# Patient Record
Sex: FEMALE | Race: WHITE | NOT HISPANIC OR LATINO | Employment: OTHER | URBAN - METROPOLITAN AREA
[De-identification: names, ages, dates, MRNs, and addresses within clinical notes are randomized per-mention and may not be internally consistent; named-entity substitution may affect disease eponyms.]

---

## 2018-02-08 ENCOUNTER — OFFICE VISIT (OUTPATIENT)
Dept: URGENT CARE | Facility: CLINIC | Age: 83
End: 2018-02-08
Payer: MEDICARE

## 2018-02-08 VITALS
TEMPERATURE: 97.2 F | DIASTOLIC BLOOD PRESSURE: 67 MMHG | SYSTOLIC BLOOD PRESSURE: 145 MMHG | HEART RATE: 77 BPM | RESPIRATION RATE: 18 BRPM | OXYGEN SATURATION: 96 %

## 2018-02-08 DIAGNOSIS — H66.91 RIGHT OTITIS MEDIA, UNSPECIFIED OTITIS MEDIA TYPE: ICD-10-CM

## 2018-02-08 DIAGNOSIS — H61.21 IMPACTED CERUMEN OF RIGHT EAR: Primary | ICD-10-CM

## 2018-02-08 PROCEDURE — G0463 HOSPITAL OUTPT CLINIC VISIT: HCPCS | Performed by: NURSE PRACTITIONER

## 2018-02-08 PROCEDURE — 99203 OFFICE O/P NEW LOW 30 MIN: CPT | Performed by: NURSE PRACTITIONER

## 2018-02-08 PROCEDURE — 69209 REMOVE IMPACTED EAR WAX UNI: CPT | Performed by: NURSE PRACTITIONER

## 2018-02-08 RX ORDER — AMOXICILLIN AND CLAVULANATE POTASSIUM 875; 125 MG/1; MG/1
1 TABLET, FILM COATED ORAL 2 TIMES DAILY
Qty: 14 TABLET | Refills: 0 | Status: SHIPPED | OUTPATIENT
Start: 2018-02-08 | End: 2018-02-15

## 2018-02-08 NOTE — PROGRESS NOTES
Assessment/Plan     Diagnoses and all orders for this visit:    Impacted cerumen of right ear    Right otitis media, unspecified otitis media type  -     amoxicillin-clavulanate (AUGMENTIN) 875-125 mg per tablet; Take 1 tablet by mouth 2 (two) times a day for 7 days          Subjective:     Patient ID: Julianne Rodriguez is a 80 y o  female  Chief Complaint:    Earache    The current episode started in the past 7 days  The problem has been unchanged  There has been no fever  Associated symptoms include hearing loss  Pertinent negatives include no coughing, ear discharge, rhinorrhea or sore throat  No past medical history on file  No past surgical history on file  No family history on file  Review of Systems   Constitutional: Negative  HENT: Positive for ear pain and hearing loss  Negative for ear discharge, rhinorrhea and sore throat  Eyes: Negative  Respiratory: Negative for cough  Cardiovascular: Negative  Gastrointestinal: Negative  Genitourinary: Negative  Musculoskeletal: Negative  Neurological: Negative  Objective:    /67   Pulse 77   Temp (!) 97 2 °F (36 2 °C)   Resp 18   SpO2 96%       Physical Exam   Constitutional: She is oriented to person, place, and time  She appears well-developed and well-nourished  No distress  HENT:   Head: Normocephalic and atraumatic  Right Ear: Tympanic membrane is erythematous and bulging  Left Ear: External ear normal    A large amt of brown cerumen was removed from right ear canal   TM was red and bulging  Eyes: Conjunctivae and EOM are normal    Neck: Normal range of motion  Neck supple  Cardiovascular: Normal rate, regular rhythm and normal heart sounds  Pulmonary/Chest: Effort normal and breath sounds normal    Abdominal: Soft  Bowel sounds are normal    Lymphadenopathy:     She has cervical adenopathy  Neurological: She is alert and oriented to person, place, and time  She has normal reflexes     Skin: Skin is warm and dry  No rash noted  Psychiatric: She has a normal mood and affect  Patient Instructions   Follow up with PCP in 48-72 hours if no significant improvement of if symptoms worsen  Ear cerumen removal  Date/Time: 2/8/2018 4:10 PM  Performed by: Radha Lee by: Aileen Cao     Other Assisting Provider: No    Consent:     Consent obtained:  Verbal    Consent given by:  Patient    Risks discussed:  Bleeding, dizziness, incomplete removal, pain, TM perforation and infection    Alternatives discussed:  No treatment and delayed treatment  Universal protocol:     Procedure explained and questions answered to patient or proxy's satisfaction: yes    Procedure details:     Local anesthetic:  None    Location:  R ear    Procedure type: irrigation      Approach:  Natural orifice  Post-procedure details:     Complication:  None    Patient tolerance of procedure:   Tolerated well, no immediate complications

## 2018-05-23 LAB
ALBUMIN SERPL BCP-MCNC: 4.3 G/DL (ref 3.5–5.7)
ALP SERPL-CCNC: 61 IU/L (ref 55–165)
ALT SERPL W P-5'-P-CCNC: 21 IU/L (ref 5–26)
ANION GAP SERPL CALCULATED.3IONS-SCNC: 12 MM/L
ANISOCYTOSIS (HISTORICAL): SLIGHT
AST SERPL W P-5'-P-CCNC: 36 U/L (ref 7–26)
BANDS (HISTORICAL): 2
BASOPHILS # BLD AUTO: 0 X3/UL (ref 0–0.3)
BASOPHILS # BLD AUTO: 0.8 % (ref 0–2)
BILIRUB SERPL-MCNC: 1 MG/DL (ref 0.3–1)
BUN SERPL-MCNC: 23 MG/DL (ref 7–25)
CALCIUM SERPL-MCNC: 9.8 MG/DL (ref 8.6–10.5)
CHLORIDE SERPL-SCNC: 103 MM/L (ref 98–107)
CHOLEST SERPL-MCNC: 195 MG/DL (ref 0–200)
CO2 SERPL-SCNC: 29 MM/L (ref 21–31)
CREAT SERPL-MCNC: 0.8 MG/DL (ref 0.6–1.2)
DEPRECATED RDW RBC AUTO: 12.4 % (ref 11.5–14.5)
EGFR (HISTORICAL): > 60 GFR
EGFR AFRICAN AMERICAN (HISTORICAL): > 60 GFR
EOSINOPHIL # BLD AUTO: 0 X3/UL (ref 0–0.5)
EOSINOPHIL NFR BLD AUTO: 1.2 % (ref 0–5)
GLUCOSE (HISTORICAL): 83 MG/DL (ref 65–99)
HCT VFR BLD AUTO: 37.8 % (ref 37–47)
HDLC SERPL-MCNC: 71 MG/DL (ref 40–60)
HGB BLD-MCNC: 12.8 G/DL (ref 12–16)
LDLC SERPL CALC-MCNC: 112.4 MG/DL (ref 75–193)
LYMPHOCYTES # BLD AUTO: 0.7 X3/UL (ref 1.2–4.2)
LYMPHOCYTES NFR BLD AUTO: 20 % (ref 20.5–51.1)
LYMPHOCYTES NFR BLD AUTO: 24.8 % (ref 20.5–51.1)
MACROCYTOSIS (HISTORICAL): SLIGHT
MCH RBC QN AUTO: 32.7 PG (ref 26–34)
MCHC RBC AUTO-ENTMCNC: 33.9 G/DL (ref 31–36)
MCV RBC AUTO: 96.5 FL (ref 81–99)
MONOCYTES # BLD AUTO: 0.3 X3/UL (ref 0–1)
MONOCYTES (HISTORICAL): 10 % (ref 1.7–12)
MONOCYTES NFR BLD AUTO: 10.8 % (ref 1.7–12)
NEUTROPHILS # BLD AUTO: 1.8 X3/UL (ref 1.4–6.5)
NEUTROPHILS ABS COUNT (HISTORICAL): 68 % (ref 42.2–75.2)
NEUTS SEG NFR BLD AUTO: 62.4 % (ref 42.2–75.2)
OSMOLALITY, SERUM (HISTORICAL): 282 MOSM (ref 262–291)
PLATELET # BLD AUTO: 159 X3/UL (ref 130–400)
PLATELET ESTIMATE (HISTORICAL): NORMAL
PMV BLD AUTO: 8.5 FL (ref 8.6–11.7)
POTASSIUM SERPL-SCNC: 4 MM/L (ref 3.5–5.5)
RBC # BLD AUTO: 3.92 X6/UL (ref 3.9–5.2)
RBC MORPHOLOGY (HISTORICAL): NORMAL
SODIUM SERPL-SCNC: 140 MM/L (ref 134–143)
TOTAL PROTEIN (HISTORICAL): 6.4 G/DL (ref 6.4–8.9)
TRIGL SERPL-MCNC: 60 MG/DL (ref 44–166)
VLDL CHOLESTEROL (HISTORICAL): 12 MG/DL (ref 5–51)
WBC # BLD AUTO: 2.9 X3/UL (ref 4.8–10.8)

## 2019-02-05 ENCOUNTER — APPOINTMENT (OUTPATIENT)
Dept: LAB | Facility: HOSPITAL | Age: 84
End: 2019-02-05
Attending: INTERNAL MEDICINE
Payer: MEDICARE

## 2019-02-05 ENCOUNTER — TRANSCRIBE ORDERS (OUTPATIENT)
Dept: ADMINISTRATIVE | Facility: HOSPITAL | Age: 84
End: 2019-02-05

## 2019-02-05 DIAGNOSIS — R53.83 FATIGUE, UNSPECIFIED TYPE: ICD-10-CM

## 2019-02-05 DIAGNOSIS — Z79.1 ENCOUNTER FOR LONG-TERM (CURRENT) USE OF NSAIDS: ICD-10-CM

## 2019-02-05 DIAGNOSIS — Z79.1 ENCOUNTER FOR LONG-TERM (CURRENT) USE OF NSAIDS: Primary | ICD-10-CM

## 2019-02-05 DIAGNOSIS — E55.9 VITAMIN D DEFICIENCY: ICD-10-CM

## 2019-02-05 LAB
25(OH)D3 SERPL-MCNC: 49.8 NG/ML (ref 30–100)
ALBUMIN SERPL BCP-MCNC: 4.3 G/DL (ref 3.5–5.7)
ALP SERPL-CCNC: 60 U/L (ref 55–165)
ALT SERPL W P-5'-P-CCNC: 17 U/L (ref 7–52)
ANION GAP SERPL CALCULATED.3IONS-SCNC: 7 MMOL/L (ref 4–13)
AST SERPL W P-5'-P-CCNC: 31 U/L (ref 13–39)
BILIRUB SERPL-MCNC: 0.9 MG/DL (ref 0.2–1)
BUN SERPL-MCNC: 27 MG/DL (ref 7–25)
CALCIUM SERPL-MCNC: 9.9 MG/DL (ref 8.6–10.5)
CHLORIDE SERPL-SCNC: 104 MMOL/L (ref 98–107)
CO2 SERPL-SCNC: 27 MMOL/L (ref 21–31)
CREAT SERPL-MCNC: 0.81 MG/DL (ref 0.6–1.2)
ERYTHROCYTE [DISTWIDTH] IN BLOOD BY AUTOMATED COUNT: 12.5 % (ref 11.5–14.5)
GFR SERPL CREATININE-BSD FRML MDRD: 66 ML/MIN/1.73SQ M
GLUCOSE P FAST SERPL-MCNC: 82 MG/DL (ref 65–99)
HCT VFR BLD AUTO: 41.2 % (ref 34.8–46.1)
HGB BLD-MCNC: 13.7 G/DL (ref 12–16)
MCH RBC QN AUTO: 32.6 PG (ref 26–34)
MCHC RBC AUTO-ENTMCNC: 33.1 G/DL (ref 31–37)
MCV RBC AUTO: 98 FL (ref 81–99)
PLATELET # BLD AUTO: 144 THOUSANDS/UL (ref 149–390)
PMV BLD AUTO: 9.6 FL (ref 8.6–11.7)
POTASSIUM SERPL-SCNC: 3.7 MMOL/L (ref 3.5–5.5)
PROT SERPL-MCNC: 6.6 G/DL (ref 6.4–8.9)
RBC # BLD AUTO: 4.19 MILLION/UL (ref 3.9–5.2)
SODIUM SERPL-SCNC: 138 MMOL/L (ref 134–143)
T4 FREE SERPL-MCNC: 1.43 NG/DL (ref 0.76–1.46)
TSH SERPL DL<=0.05 MIU/L-ACNC: 1.92 UIU/ML (ref 0.45–5.33)
WBC # BLD AUTO: 2.5 THOUSAND/UL (ref 4.8–10.8)

## 2019-02-05 PROCEDURE — 85027 COMPLETE CBC AUTOMATED: CPT

## 2019-02-05 PROCEDURE — 84439 ASSAY OF FREE THYROXINE: CPT

## 2019-02-05 PROCEDURE — 82306 VITAMIN D 25 HYDROXY: CPT

## 2019-02-05 PROCEDURE — 80053 COMPREHEN METABOLIC PANEL: CPT

## 2019-02-05 PROCEDURE — 84443 ASSAY THYROID STIM HORMONE: CPT

## 2019-02-05 PROCEDURE — 36415 COLL VENOUS BLD VENIPUNCTURE: CPT

## 2020-07-02 ENCOUNTER — HOSPITAL ENCOUNTER (INPATIENT)
Facility: HOSPITAL | Age: 85
LOS: 1 days | Discharge: HOME | DRG: 603 | End: 2020-07-04
Attending: EMERGENCY MEDICINE | Admitting: INTERNAL MEDICINE
Payer: MEDICARE

## 2020-07-02 ENCOUNTER — APPOINTMENT (OUTPATIENT)
Dept: CT IMAGING | Facility: HOSPITAL | Age: 85
DRG: 603 | End: 2020-07-02
Payer: MEDICARE

## 2020-07-02 DIAGNOSIS — L03.90 CELLULITIS: Primary | ICD-10-CM

## 2020-07-02 PROBLEM — D72.819 LEUKOPENIA: Status: ACTIVE | Noted: 2020-07-02

## 2020-07-02 PROBLEM — L03.116 CELLULITIS OF LEFT LOWER EXTREMITY: Status: ACTIVE | Noted: 2020-07-02

## 2020-07-02 LAB
ALBUMIN SERPL BCP-MCNC: 4 G/DL (ref 3.5–5.7)
ALP SERPL-CCNC: 50 U/L (ref 55–165)
ALT SERPL W P-5'-P-CCNC: 16 U/L (ref 7–52)
ANION GAP SERPL CALCULATED.3IONS-SCNC: 10 MMOL/L (ref 4–13)
AST SERPL W P-5'-P-CCNC: 28 U/L (ref 13–39)
BILIRUB SERPL-MCNC: 0.7 MG/DL (ref 0.2–1)
BUN SERPL-MCNC: 16 MG/DL (ref 7–25)
CALCIUM SERPL-MCNC: 9.4 MG/DL (ref 8.6–10.5)
CHLORIDE SERPL-SCNC: 105 MMOL/L (ref 98–107)
CO2 SERPL-SCNC: 25 MMOL/L (ref 21–31)
CREAT SERPL-MCNC: 0.66 MG/DL (ref 0.6–1.2)
ERYTHROCYTE [DISTWIDTH] IN BLOOD BY AUTOMATED COUNT: 13.5 % (ref 11.5–14.5)
GFR SERPL CREATININE-BSD FRML MDRD: 79 ML/MIN/1.73SQ M
GLUCOSE SERPL-MCNC: 87 MG/DL (ref 65–99)
HCT VFR BLD AUTO: 35.1 % (ref 42–47)
HGB BLD-MCNC: 12 G/DL (ref 12–16)
LACTATE SERPL-SCNC: 0.6 MMOL/L (ref 0.5–2)
LYMPHOCYTES # BLD AUTO: 0.88 THOUSAND/UL (ref 0.6–4.47)
LYMPHOCYTES # BLD AUTO: 34 % (ref 20–51)
MCH RBC QN AUTO: 33.8 PG (ref 26–34)
MCHC RBC AUTO-ENTMCNC: 34.3 G/DL (ref 31–37)
MCV RBC AUTO: 99 FL (ref 81–99)
MONOCYTES # BLD AUTO: 0.16 THOUSAND/UL (ref 0–1.22)
MONOCYTES NFR BLD AUTO: 6 % (ref 4–12)
NEUTS SEG # BLD: 1.56 THOUSAND/UL (ref 1.81–6.82)
NEUTS SEG NFR BLD AUTO: 60 % (ref 42–75)
PLATELET # BLD AUTO: 190 THOUSANDS/UL (ref 149–390)
PLATELET BLD QL SMEAR: ADEQUATE
PMV BLD AUTO: 7.5 FL (ref 8.6–11.7)
POTASSIUM SERPL-SCNC: 3.6 MMOL/L (ref 3.5–5.5)
PROT SERPL-MCNC: 6.5 G/DL (ref 6.4–8.9)
RBC # BLD AUTO: 3.56 MILLION/UL (ref 3.9–5.2)
RBC MORPH BLD: NORMAL
SODIUM SERPL-SCNC: 140 MMOL/L (ref 134–143)
TOTAL CELLS COUNTED SPEC: 100
WBC # BLD AUTO: 2.6 THOUSAND/UL (ref 4.8–10.8)

## 2020-07-02 PROCEDURE — 99284 EMERGENCY DEPT VISIT MOD MDM: CPT

## 2020-07-02 PROCEDURE — 85007 BL SMEAR W/DIFF WBC COUNT: CPT | Performed by: PHYSICIAN ASSISTANT

## 2020-07-02 PROCEDURE — 85027 COMPLETE CBC AUTOMATED: CPT | Performed by: PHYSICIAN ASSISTANT

## 2020-07-02 PROCEDURE — 73701 CT LOWER EXTREMITY W/DYE: CPT

## 2020-07-02 PROCEDURE — 87205 SMEAR GRAM STAIN: CPT | Performed by: PHYSICIAN ASSISTANT

## 2020-07-02 PROCEDURE — 80053 COMPREHEN METABOLIC PANEL: CPT | Performed by: PHYSICIAN ASSISTANT

## 2020-07-02 PROCEDURE — 99285 EMERGENCY DEPT VISIT HI MDM: CPT | Performed by: PHYSICIAN ASSISTANT

## 2020-07-02 PROCEDURE — 87186 SC STD MICRODIL/AGAR DIL: CPT | Performed by: PHYSICIAN ASSISTANT

## 2020-07-02 PROCEDURE — 96365 THER/PROPH/DIAG IV INF INIT: CPT

## 2020-07-02 PROCEDURE — 87070 CULTURE OTHR SPECIMN AEROBIC: CPT | Performed by: PHYSICIAN ASSISTANT

## 2020-07-02 PROCEDURE — 83605 ASSAY OF LACTIC ACID: CPT | Performed by: PHYSICIAN ASSISTANT

## 2020-07-02 PROCEDURE — 87040 BLOOD CULTURE FOR BACTERIA: CPT | Performed by: PHYSICIAN ASSISTANT

## 2020-07-02 PROCEDURE — 36415 COLL VENOUS BLD VENIPUNCTURE: CPT | Performed by: PHYSICIAN ASSISTANT

## 2020-07-02 PROCEDURE — 87077 CULTURE AEROBIC IDENTIFY: CPT | Performed by: PHYSICIAN ASSISTANT

## 2020-07-02 PROCEDURE — 99220 PR INITIAL OBSERVATION CARE/DAY 70 MINUTES: CPT | Performed by: NURSE PRACTITIONER

## 2020-07-02 PROCEDURE — 1124F ACP DISCUSS-NO DSCNMKR DOCD: CPT | Performed by: PHYSICIAN ASSISTANT

## 2020-07-02 RX ORDER — VANCOMYCIN HYDROCHLORIDE 500 MG/100ML
10 INJECTION, SOLUTION INTRAVENOUS EVERY 12 HOURS
Status: DISCONTINUED | OUTPATIENT
Start: 2020-07-03 | End: 2020-07-04 | Stop reason: DRUGHIGH

## 2020-07-02 RX ORDER — CEFAZOLIN SODIUM 2 G/50ML
2000 SOLUTION INTRAVENOUS EVERY 8 HOURS
Status: DISCONTINUED | OUTPATIENT
Start: 2020-07-02 | End: 2020-07-03

## 2020-07-02 RX ORDER — ACETAMINOPHEN 325 MG/1
650 TABLET ORAL 4 TIMES DAILY PRN
Status: DISCONTINUED | OUTPATIENT
Start: 2020-07-02 | End: 2020-07-04 | Stop reason: HOSPADM

## 2020-07-02 RX ORDER — CEFEPIME HYDROCHLORIDE 2 G/50ML
2000 INJECTION, SOLUTION INTRAVENOUS ONCE
Status: COMPLETED | OUTPATIENT
Start: 2020-07-02 | End: 2020-07-02

## 2020-07-02 RX ORDER — IBUPROFEN 200 MG
200 TABLET ORAL EVERY 6 HOURS PRN
COMMUNITY
End: 2020-11-08

## 2020-07-02 RX ORDER — BACITRACIN, NEOMYCIN, POLYMYXIN B 400; 3.5; 5 [USP'U]/G; MG/G; [USP'U]/G
1 OINTMENT TOPICAL DAILY
Status: DISCONTINUED | OUTPATIENT
Start: 2020-07-02 | End: 2020-07-04 | Stop reason: HOSPADM

## 2020-07-02 RX ADMIN — CEFAZOLIN SODIUM 2000 MG: 2 SOLUTION INTRAVENOUS at 21:35

## 2020-07-02 RX ADMIN — SODIUM CHLORIDE 500 ML: 0.9 INJECTION, SOLUTION INTRAVENOUS at 13:55

## 2020-07-02 RX ADMIN — CEFEPIME HYDROCHLORIDE 2000 MG: 2 INJECTION, SOLUTION INTRAVENOUS at 13:55

## 2020-07-02 RX ADMIN — IOHEXOL 100 ML: 350 INJECTION, SOLUTION INTRAVENOUS at 14:56

## 2020-07-02 RX ADMIN — VANCOMYCIN HYDROCHLORIDE 750 MG: 750 INJECTION, SOLUTION INTRAVENOUS at 14:45

## 2020-07-02 RX ADMIN — BACITRACIN, NEOMYCIN, POLYMYXIN B 1 SMALL APPLICATION: 400; 3.5; 5 OINTMENT TOPICAL at 21:34

## 2020-07-02 NOTE — H&P
H&P- Daiana Purcell 12/29/1931, 80 y o  female MRN: [id number]    Unit/Bed#: -01 Encounter: [id number]    Primary Care Provider: Sarah Jean MD   Date and time admitted to hospital: 7/2/2020  1:02 PM        * Cellulitis  Assessment & Plan  · Failed outpatient therapy  · The patient denies any prior MRSA infection  · The patient received cefepime and vancomycin in the ED; will continue with vancomycin and change cefepime to cefazolin  · Follow-up wound culture and blood cultures  · The patient is afebrile  · Local wound care    Leukopenia  Assessment & Plan  · This appears to be chronic for patient  · Will monitor CBCs closely    VTE Prophylaxis: Enoxaparin (Lovenox)  Code Status: Full Code   POLST: POLST is not applicable to this patient  Discussion with family: none present during exam     Anticipated Length of Stay:  Patient will be admitted on an Observation basis with an anticipated length of stay of  < 2 midnights  Justification for Hospital Stay: cellulitis     Chief Complaint:   Worsening cellulitis of the left lower extremity    History of Present Illness:    Daiana Purcell is a 80 y o  female who presents with worsening cellulitis of the left lower extremity  The patient sustained an injury on her left lower leg from a dog crate falling on it approximately 3-4 weeks ago  She was prescribed 7 days course of Bactrim from her PCP which she finished approximately 2 weeks ago  The patient states that initially the cellulitis was improving and the redness got much better  However today the patient the patient states that she was developing pain, increased swelling, worsening erythema and subsequently came into the ED for further evaluation and treatment  The patient denies any fevers, chills, nausea, vomiting, abdominal pain, or any urinary symptoms  In the ED, a scab over a scratchy area was removed and there was purulent drainage with foul smell that was drained  Wound culture was obtained  The patient received vancomycin and cefepime in the ED  Review of Systems:  Review of Systems   Constitutional: Negative for activity change, appetite change, chills, diaphoresis and fever  HENT: Negative for congestion, ear pain, hearing loss, tinnitus and trouble swallowing  Eyes: Negative for photophobia, pain, discharge, itching and visual disturbance  Respiratory: Negative for cough, shortness of breath, wheezing and stridor  Cardiovascular: Negative for chest pain, palpitations and leg swelling  Gastrointestinal: Negative for abdominal pain, blood in stool, constipation, diarrhea, nausea and vomiting  Endocrine: Negative for cold intolerance, heat intolerance, polydipsia, polyphagia and polyuria  Genitourinary: Negative for difficulty urinating, dysuria, frequency, hematuria and urgency  Musculoskeletal: Negative for back pain, gait problem and neck stiffness  Skin: Positive for color change and wound  Negative for pallor and rash  Allergic/Immunologic: Negative for environmental allergies, food allergies and immunocompromised state  Neurological: Negative for dizziness, tremors, speech difficulty, weakness, light-headedness, numbness and headaches  Hematological: Negative for adenopathy  Does not bruise/bleed easily  Psychiatric/Behavioral: Negative for confusion, hallucinations and sleep disturbance  Past Medical and Surgical History:   History reviewed  No pertinent past medical history  Past Surgical History:   Procedure Laterality Date    BUNIONECTOMY      JOINT REPLACEMENT         Meds/Allergies:  Prior to Admission medications    Medication Sig Start Date End Date Taking? Authorizing Provider   ibuprofen (MOTRIN) 200 mg tablet Take 200 mg by mouth every 6 (six) hours as needed for mild pain   Yes Historical Provider, MD     I have reviewed home medications with patient personally  Allergies: No Known Allergies    Social History:  Marital Status:   Occupation:    Patient Pre-hospital Living Situation: family   Patient Pre-hospital Level of Mobility: independent   Patient Pre-hospital Diet Restrictions: none   Substance Use History:   Social History     Substance and Sexual Activity   Alcohol Use Never    Alcohol/week: 0 0 standard drinks    Frequency: Never    Drinks per session: Patient refused    Binge frequency: Never     Social History     Tobacco Use   Smoking Status Former Smoker    Packs/day: 2 00    Years: 10 00    Pack years: 20 00    Types: Cigarettes    Last attempt to quit: 1957    Years since quittin 5   Smokeless Tobacco Never Used     Social History     Substance and Sexual Activity   Drug Use Not Currently       Family History:  I have reviewed the patients family history    Physical Exam:   Vitals:   Blood Pressure: 167/75 (20 1523)  Pulse: 81 (20)  Temperature: (!) 97 2 °F (36 2 °C) (20)  Temp Source: Temporal (20)  Respirations: 18 (20)  Height: 5' 4" (162 6 cm) (20)  Weight - Scale: 51 6 kg (113 lb 13 9 oz) (20)  SpO2: 97 % (20)    Physical Exam    Additional Data:   Lab Results: I have personally reviewed pertinent reports  Results from last 7 days   Lab Units 20  1351   WBC Thousand/uL 2 60*   HEMOGLOBIN g/dL 12 0   HEMATOCRIT % 35 1*   PLATELETS Thousands/uL 190   LYMPHO PCT % 34   MONO PCT % 6     Results from last 7 days   Lab Units 20  1351   POTASSIUM mmol/L 3 6   CHLORIDE mmol/L 105   CO2 mmol/L 25   BUN mg/dL 16   CREATININE mg/dL 0 66   CALCIUM mg/dL 9 4   ALK PHOS U/L 50*   ALT U/L 16   AST U/L 28                   Imaging: I have personally reviewed pertinent reports  CT lower extremity w contrast left   Final Result by Nenita Nicole MD (1515)      1  No evidence of deep infection or osteomyelitis   2    Skin irregularity and soft tissue swelling overlying the distal fibula         Workstation performed: [id number]             EKG, Pathology, and Other Studies Reviewed on Admission:   · EKG: n/a     Epic Records Reviewed: Yes     ** Please Note: This note has been constructed using a voice recognition system   **

## 2020-07-02 NOTE — ASSESSMENT & PLAN NOTE
· Failed outpatient therapy  · The patient denies any prior MRSA infection  · The patient received cefepime and vancomycin in the ED; will continue with vancomycin and change cefepime to cefazolin     · Follow-up wound culture and blood cultures  · The patient is afebrile  · Local wound care

## 2020-07-03 LAB
ANION GAP SERPL CALCULATED.3IONS-SCNC: 6 MMOL/L (ref 4–13)
BASOPHILS # BLD AUTO: 0 THOUSANDS/ΜL (ref 0–0.1)
BASOPHILS NFR BLD AUTO: 1 % (ref 0–2)
BUN SERPL-MCNC: 15 MG/DL (ref 7–25)
CALCIUM SERPL-MCNC: 9.2 MG/DL (ref 8.6–10.5)
CHLORIDE SERPL-SCNC: 106 MMOL/L (ref 98–107)
CO2 SERPL-SCNC: 30 MMOL/L (ref 21–31)
CREAT SERPL-MCNC: 0.72 MG/DL (ref 0.6–1.2)
EOSINOPHIL # BLD AUTO: 0 THOUSAND/ΜL (ref 0–0.61)
EOSINOPHIL NFR BLD AUTO: 1 % (ref 0–5)
ERYTHROCYTE [DISTWIDTH] IN BLOOD BY AUTOMATED COUNT: 13.7 % (ref 11.5–14.5)
GFR SERPL CREATININE-BSD FRML MDRD: 75 ML/MIN/1.73SQ M
GLUCOSE SERPL-MCNC: 88 MG/DL (ref 65–99)
HCT VFR BLD AUTO: 35.8 % (ref 42–47)
HGB BLD-MCNC: 12.3 G/DL (ref 12–16)
LYMPHOCYTES # BLD AUTO: 0.3 THOUSANDS/ΜL (ref 0.6–4.47)
LYMPHOCYTES NFR BLD AUTO: 10 % (ref 21–51)
MCH RBC QN AUTO: 34.2 PG (ref 26–34)
MCHC RBC AUTO-ENTMCNC: 34.5 G/DL (ref 31–37)
MCV RBC AUTO: 99 FL (ref 81–99)
MONOCYTES # BLD AUTO: 0.3 THOUSAND/ΜL (ref 0.17–1.22)
MONOCYTES NFR BLD AUTO: 10 % (ref 2–12)
NEUTROPHILS # BLD AUTO: 2.6 THOUSANDS/ΜL (ref 1.4–6.5)
NEUTS SEG NFR BLD AUTO: 79 % (ref 42–75)
PLATELET # BLD AUTO: 188 THOUSANDS/UL (ref 149–390)
PMV BLD AUTO: 7.8 FL (ref 8.6–11.7)
POTASSIUM SERPL-SCNC: 3.5 MMOL/L (ref 3.5–5.5)
RBC # BLD AUTO: 3.61 MILLION/UL (ref 3.9–5.2)
SODIUM SERPL-SCNC: 142 MMOL/L (ref 134–143)
WBC # BLD AUTO: 3.3 THOUSAND/UL (ref 4.8–10.8)

## 2020-07-03 PROCEDURE — 85025 COMPLETE CBC W/AUTO DIFF WBC: CPT | Performed by: NURSE PRACTITIONER

## 2020-07-03 PROCEDURE — 80048 BASIC METABOLIC PNL TOTAL CA: CPT | Performed by: NURSE PRACTITIONER

## 2020-07-03 PROCEDURE — 99232 SBSQ HOSP IP/OBS MODERATE 35: CPT | Performed by: NURSE PRACTITIONER

## 2020-07-03 RX ORDER — CEFAZOLIN SODIUM 1 G/50ML
1000 SOLUTION INTRAVENOUS EVERY 8 HOURS
Status: DISCONTINUED | OUTPATIENT
Start: 2020-07-03 | End: 2020-07-04

## 2020-07-03 RX ADMIN — CEFAZOLIN SODIUM 1000 MG: 1 SOLUTION INTRAVENOUS at 21:36

## 2020-07-03 RX ADMIN — VANCOMYCIN HYDROCHLORIDE 500 MG: 500 INJECTION, SOLUTION INTRAVENOUS at 02:42

## 2020-07-03 RX ADMIN — VANCOMYCIN HYDROCHLORIDE 500 MG: 500 INJECTION, SOLUTION INTRAVENOUS at 15:17

## 2020-07-03 RX ADMIN — CEFAZOLIN SODIUM 2000 MG: 2 SOLUTION INTRAVENOUS at 14:19

## 2020-07-03 RX ADMIN — BACITRACIN, NEOMYCIN, POLYMYXIN B 1 SMALL APPLICATION: 400; 3.5; 5 OINTMENT TOPICAL at 09:04

## 2020-07-03 RX ADMIN — ENOXAPARIN SODIUM 40 MG: 40 INJECTION SUBCUTANEOUS at 09:04

## 2020-07-03 RX ADMIN — CEFAZOLIN SODIUM 2000 MG: 2 SOLUTION INTRAVENOUS at 05:38

## 2020-07-03 NOTE — CONSULTS
Vancomycin IV Pharmacy-To-Dose Consultation:    Brennan Menezes is a 80 y o  female who is currently receiving Vancomycin IV with management by the Pharmacy Consult service for the treatment of skin-soft tissue infection    Assessment/Plan:    The patient's chart was reviewed  Renal function is stable  There are no signs or symptoms of nephrotoxicity and/or infusion reactions documented  Based on today's assessment, will continue current vancomycin (Day # 2) dosing of 500 mg IV Q 12 Hrs, with a plan for trough to be drawn at 0215 on 07/4/20  We will continue to follow the patient's culture results and clinical progress daily      Macrina Joyner, Pharmacist

## 2020-07-03 NOTE — ED PROVIDER NOTES
History  Chief Complaint   Patient presents with    Wound Infection     left ankle wound from dog odalys, about a month ago  Was being treated by her PCP for cellulitis     80-year-old female presents emergency department with concern for a wound infection on the left ankle  She states that she has been attempting to treat the ankle for about 2 weeks with her PCP with outpatient antibiotics with out success  She says there is an area of redness that has been slowly spreading as well as drainage from the wound  She states that she has been taking Bactrim at home  Overall the patient is well-appearing in no acute distress  Purulent drainage is noted from the when the left lower extremity  Allergies reviewed          Prior to Admission Medications   Prescriptions Last Dose Informant Patient Reported? Taking?   ibuprofen (MOTRIN) 200 mg tablet Past Week at Unknown time Self Yes Yes   Sig: Take 200 mg by mouth every 6 (six) hours as needed for mild pain      Facility-Administered Medications: None       History reviewed  No pertinent past medical history  Past Surgical History:   Procedure Laterality Date    BUNIONECTOMY      JOINT REPLACEMENT         Family History   Family history unknown: Yes     I have reviewed and agree with the history as documented  E-Cigarette/Vaping    E-Cigarette Use Never User      E-Cigarette/Vaping Substances     Social History     Tobacco Use    Smoking status: Former Smoker     Packs/day: 2 00     Years: 10 00     Pack years: 20 00     Types: Cigarettes     Last attempt to quit:      Years since quittin 5    Smokeless tobacco: Never Used   Substance Use Topics    Alcohol use: Never     Alcohol/week: 0 0 standard drinks     Frequency: Never     Drinks per session: Patient refused     Binge frequency: Never    Drug use: Not Currently       Review of Systems   Constitutional: Negative for chills, fatigue and fever     HENT: Negative for congestion, ear pain, rhinorrhea, sinus pressure, sneezing, sore throat and trouble swallowing  Eyes: Negative for discharge and itching  Respiratory: Negative for cough, chest tightness, shortness of breath, wheezing and stridor  Cardiovascular: Negative for chest pain and palpitations  Gastrointestinal: Negative for abdominal pain, diarrhea, nausea and vomiting  Skin: Positive for wound  Neurological: Negative for dizziness, syncope, numbness and headaches  All other systems reviewed and are negative  Physical Exam  Physical Exam   Constitutional: She is oriented to person, place, and time  She appears well-developed and well-nourished  No distress  HENT:   Head: Normocephalic and atraumatic  Right Ear: External ear normal    Left Ear: External ear normal    Nose: Nose normal    Mouth/Throat: Oropharynx is clear and moist    Eyes: Pupils are equal, round, and reactive to light  Conjunctivae and EOM are normal    Neck: Normal range of motion  Cardiovascular: Normal rate, regular rhythm, normal heart sounds and intact distal pulses  Exam reveals no gallop and no friction rub  No murmur heard  Pulmonary/Chest: Effort normal and breath sounds normal  No stridor  No respiratory distress  She has no wheezes  She has no rales  Abdominal: Soft  Bowel sounds are normal  She exhibits no distension  There is no tenderness  There is no guarding  Musculoskeletal: Normal range of motion  She exhibits no tenderness  Neurological: She is alert and oriented to person, place, and time  Skin: Skin is warm  Capillary refill takes less than 2 seconds  She is not diaphoretic  Wound above the lateral malleolus of the left ankle   Nursing note and vitals reviewed        Vital Signs  ED Triage Vitals [07/02/20 1305]   Temperature Pulse Respirations Blood Pressure SpO2   98 °F (36 7 °C) 87 18 146/84 97 %      Temp Source Heart Rate Source Patient Position - Orthostatic VS BP Location FiO2 (%)   Temporal Monitor Lying Left arm --      Pain Score       1           Vitals:    07/02/20 1305 07/02/20 1523   BP: 146/84 167/75   Pulse: 87 81   Patient Position - Orthostatic VS: Lying Lying         Visual Acuity      ED Medications  Medications   enoxaparin (LOVENOX) subcutaneous injection 40 mg (has no administration in time range)   ceFAZolin (ANCEF) IVPB (premix) 2,000 mg 50 mL (has no administration in time range)   acetaminophen (TYLENOL) tablet 650 mg (has no administration in time range)   neomycin-bacitracin-polymyxin b (NEOSPORIN) ointment 1 small application (has no administration in time range)   vancomycin (VANCOCIN) IVPB (premix) 500 mg 100 mL (has no administration in time range)   cefepime (MAXIPIME) IVPB (premix) 2,000 mg 50 mL (0 mg Intravenous Stopped 7/2/20 1425)   vancomycin (VANCOCIN) IVPB (premix) 750 mg 150 mL (750 mg Intravenous New Bag 7/2/20 1445)   sodium chloride 0 9 % bolus 500 mL (0 mL Intravenous Stopped 7/2/20 1445)   iohexol (OMNIPAQUE) 350 MG/ML injection (MULTI-DOSE) 100 mL (100 mL Intravenous Given 7/2/20 1456)       Diagnostic Studies  Results Reviewed     Procedure Component Value Units Date/Time    Lactic acid, plasma [[id number]]  (Normal) Collected:  07/02/20 1400    Lab Status:  Final result Specimen:  Blood from Arm, Right Updated:  07/02/20 1433     LACTIC ACID 0 6 mmol/L     Narrative:       Result may be elevated if tourniquet was used during collection      Comprehensive metabolic panel [[id number]]  (Abnormal) Collected:  07/02/20 1351    Lab Status:  Final result Specimen:  Blood from Arm, Right Updated:  07/02/20 1433     Sodium 140 mmol/L      Potassium 3 6 mmol/L      Chloride 105 mmol/L      CO2 25 mmol/L      ANION GAP 10 mmol/L      BUN 16 mg/dL      Creatinine 0 66 mg/dL      Glucose 87 mg/dL      Calcium 9 4 mg/dL      AST 28 U/L      ALT 16 U/L      Alkaline Phosphatase 50 U/L      Total Protein 6 5 g/dL      Albumin 4 0 g/dL      Total Bilirubin 0 70 mg/dL      eGFR 79 ml/min/1 73sq m     Narrative:       National Kid[employer] guidelines for Chronic Kidney Disease (CKD):     Stage 1 with normal or high GFR (GFR > 90 mL/min/1 73 square meters)    Stage 2 Mild CKD (GFR = 60-89 mL/min/1 73 square meters)    Stage 3A Moderate CKD (GFR = 45-59 mL/min/1 73 square meters)    Stage 3B Moderate CKD (GFR = 30-44 mL/min/1 73 square meters)    Stage 4 Severe CKD (GFR = 15-29 mL/min/1 73 square meters)    Stage 5 End Stage CKD (GFR <15 mL/min/1 73 square meters)  Note: GFR calculation is accurate only with a steady state creatinine    CBC and differential [[id number]]  (Abnormal) Collected:  07/02/20 1351    Lab Status:  Final result Specimen:  Blood from Arm, Right Updated:  07/02/20 1405     WBC 2 60 Thousand/uL      RBC 3 56 Million/uL      Hemoglobin 12 0 g/dL      Hematocrit 35 1 %      MCV 99 fL      MCH 33 8 pg      MCHC 34 3 g/dL      RDW 13 5 %      MPV 7 5 fL      Platelets 590 Thousands/uL     Wound culture and Gram stain [[id number]] Collected:  07/02/20 1400    Lab Status: In process Specimen:  Wound from Leg, Left Updated:  07/02/20 1402    Blood culture #1 [[id number]] Collected:  07/02/20 1351    Lab Status: In process Specimen:  Blood from Arm, Right Updated:  07/02/20 1402    Blood culture #2 [[id number]] Collected:  07/02/20 1351    Lab Status: In process Specimen:  Blood from Arm, Right Updated:  07/02/20 1353                 CT lower extremity w contrast left   Final Result by Darrell Mederos MD (07/02 2085)      1  No evidence of deep infection or osteomyelitis   2  Skin irregularity and soft tissue swelling overlying the distal fibula         Workstation performed: [id number]                    Procedures  Procedures         ED Course       US AUDIT      Most Recent Value   Initial Alcohol Screen: US AUDIT-C    1  How often do you have a drink containing alcohol?  0 Filed at: 07/02/2020 0831   2   How many drinks containing alcohol do you have on a typical day you are drinking? 0 Filed at: 07/02/2020 1306   3b  FEMALE Any Age, or MALE 65+: How often do you have 4 or more drinks on one occassion? 0 Filed at: 07/02/2020 1306   Audit-C Score  0 Filed at: 07/02/2020 1306                  CATHERINE/DAST-10      Most Recent Value   How many times in the past year have you    Used an illegal drug or used a prescription medication for non-medical reasons? Never Filed at: 07/02/2020 1306                                MDM  Number of Diagnoses or Management Options  Cellulitis: minor  Diagnosis management comments: Cellulitis of left lower extremity failing outpatient antibiotic treatment  Patient given cefepime vancomycin the emergency department  Will be admitted for further evaluation and management  Patient agreeable to plan  Amount and/or Complexity of Data Reviewed  Clinical lab tests: ordered and reviewed  Tests in the radiology section of CPT®: ordered and reviewed    Risk of Complications, Morbidity, and/or Mortality  Presenting problems: moderate  Diagnostic procedures: low  Management options: low          Disposition  Final diagnoses:   Cellulitis     Time reflects when diagnosis was documented in both MDM as applicable and the Disposition within this note     Time User Action Codes Description Comment    7/2/2020  2:40 PM Doug Castillo Add [L03 90] Cellulitis       ED Disposition     ED Disposition Condition Date/Time Comment    Admit Stable Thu Jul 2, 2020  2:40 PM Case was discussed with Sarah Murray and the patient's admission status was agreed to be Admission Status: observation status to the service of Dr Hal Anna   Follow-up Information    None         Current Discharge Medication List      CONTINUE these medications which have NOT CHANGED    Details   ibuprofen (MOTRIN) 200 mg tablet Take 200 mg by mouth every 6 (six) hours as needed for mild pain           No discharge procedures on file      PDMP Review     None          ED Provider  Electronically Signed by           Yesica Clemons PA-C  07/02/20 2233

## 2020-07-03 NOTE — PROGRESS NOTES
Progress Note - Shailesh Solis 1931, 80 y o  female MRN: [id number]    Unit/Bed#: -01 Encounter: [id number]    Primary Care Provider: Danay Guillory MD   Date and time admitted to hospital: 2020  1:02 PM        * Cellulitis of left lower extremity  Assessment & Plan  · Failed outpatient therapy  · The patient denies any prior MRSA infection  · The patient received cefepime and vancomycin in the ED; will continue with vancomycin and change cefepime to cefazolin  · Follow-up wound culture and blood cultures  · Clinically improving   · The patient is afebrile  · Local wound care      Leukopenia  Assessment & Plan  · This appears to be chronic for patient  · Will monitor CBCs closely          VTE Prophylaxis:  Enoxaparin (Lovenox)    Patient Centered Rounds: I have performed bedside rounds with nursing staff today  Discussions with Specialists or Other Care Team Provider:  Nursing, pharmacy  Education and Discussions with Family / Patient:  Patient    Current Length of Stay: 0 day(s)    Current Patient Status: Inpatient   Certification Statement: The patient will continue to require additional inpatient hospital stay due to Cellulitis of left lower extremity    Discharge Plan:  Pending hospital course  Will change the patient to inpatient status as she requires IV antibiotics  Code Status: Level 1 - Full Code    Subjective:   Feeling slightly better  Denies any pain  Objective:     Vitals:   Temp (24hrs), Av 6 °F (36 4 °C), Min:97 1 °F (36 2 °C), Max:98 2 °F (36 8 °C)    Temp:  [97 1 °F (36 2 °C)-98 2 °F (36 8 °C)] 98 2 °F (36 8 °C)  HR:  [70-83] 70  Resp:  [16] 16  BP: (117-152)/(56-69) 117/56  SpO2:  [94 %-97 %] 97 %  Body mass index is 19 55 kg/m²  Input and Output Summary (last 24 hours):        Intake/Output Summary (Last 24 hours) at 7/3/2020 1838  Last data filed at 7/3/2020 1700  Gross per 24 hour   Intake 660 ml   Output --   Net 660 ml       Physical Exam:     Physical Exam   Constitutional: She is oriented to person, place, and time  She appears well-developed and well-nourished  No distress  HENT:   Head: Normocephalic and atraumatic  Mouth/Throat: Oropharynx is clear and moist    Eyes: Pupils are equal, round, and reactive to light  Conjunctivae and EOM are normal    Neck: Normal range of motion  Neck supple  No thyromegaly present  Cardiovascular: Normal rate, regular rhythm and normal heart sounds  Exam reveals no gallop and no friction rub  No murmur heard  Pulmonary/Chest: Effort normal and breath sounds normal  She has no wheezes  She has no rales  Abdominal: Soft  Bowel sounds are normal  She exhibits no distension  There is no tenderness  There is no rebound  Musculoskeletal: Normal range of motion  She exhibits no edema, tenderness or deformity  Neurological: She is alert and oriented to person, place, and time  No cranial nerve deficit  Skin: Skin is warm and dry  No rash noted  There is erythema (left leg)  Psychiatric: She has a normal mood and affect  Her behavior is normal  Judgment and thought content normal    Vitals reviewed  Additional Data:     Labs:    Results from last 7 days   Lab Units 07/03/20  0536   WBC Thousand/uL 3 30*   HEMOGLOBIN g/dL 12 3   HEMATOCRIT % 35 8*   PLATELETS Thousands/uL 188   NEUTROS PCT % 79*   LYMPHS PCT % 10*   MONOS PCT % 10   EOS PCT % 1     Results from last 7 days   Lab Units 07/03/20  0536 07/02/20  1351   POTASSIUM mmol/L 3 5 3 6   CHLORIDE mmol/L 106 105   CO2 mmol/L 30 25   BUN mg/dL 15 16   CREATININE mg/dL 0 72 0 66   CALCIUM mg/dL 9 2 9 4   ALK PHOS U/L  --  50*   ALT U/L  --  16   AST U/L  --  28                   * I Have Reviewed All Lab Data Listed Above  * Additional Pertinent Lab Tests Reviewed:  [facility]  Reviewed    Imaging:  Imaging Reports Reviewed Today Include: n/a     Recent Cultures (last 7 days):     Results from last 7 days   Lab Units 07/02/20  1400 07/02/20  1351   BLOOD CULTURE   --  Received in Microbiology Lab  Culture in Progress  Received in Microbiology Lab  Culture in Progress  GRAM STAIN RESULT  No Polys or Bacteria seen  --    WOUND CULTURE  4+ Growth of Staphylococcus species*  --        Last 24 Hours Medication List:     Current Facility-Administered Medications:  acetaminophen 650 mg Oral 4x Daily PRN RAMIRO Sim    cefazolin 1,000 mg Intravenous Q8H RAMIRO Sim    enoxaparin 40 mg Subcutaneous Daily RAMIRO Sim    neomycin-bacitracin-polymyxin b 1 small application Topical Daily RAMIRO Sim    vancomycin 10 mg/kg Intravenous Q12H RAMIRO Sim Last Rate: 500 mg (07/03/20 1517)        Today, Patient Was Seen By: RAMIRO Sim    ** Please Note: Dictation voice to text software may have been used in the creation of this document   **

## 2020-07-03 NOTE — ASSESSMENT & PLAN NOTE
· Failed outpatient therapy  · The patient denies any prior MRSA infection  · The patient received cefepime and vancomycin in the ED; will continue with vancomycin and change cefepime to cefazolin     · Follow-up wound culture and blood cultures  · Clinically improving   · The patient is afebrile  · Local wound care

## 2020-07-03 NOTE — PROGRESS NOTES
Vancomycin Assessment    Gunner Levin is a 80 y o  female who is currently receiving vancomycin 750 mg iv q 24hrs for skin-soft tissue infection     Relevant clinical data and objective history reviewed:  Creatinine   Date Value Ref Range Status   07/02/2020 0 66 0 60 - 1 20 mg/dL Final     Comment:     Standardized to IDMS reference method   02/05/2019 0 81 0 60 - 1 20 mg/dL Final     Comment:     Standardized to IDMS reference method   05/23/2018 0 80 0 6 - 1 2 MG/DL Final     Comment:     IDMS method performed  The drugs Metamizole, Sulfasalazine, and Sulfapyridine may interfere and  result in false low results  /75 (BP Location: Left arm)   Pulse 81   Temp (!) 97 2 °F (36 2 °C) (Temporal)   Resp 18   Ht 5' 4" (1 626 m)   Wt 51 6 kg (113 lb 13 9 oz)   SpO2 97%   BMI 19 55 kg/m²   I/O last 3 completed shifts: In: 790 [P O :240; IV Piggyback:550]  Out: -   Lab Results   Component Value Date/Time    BUN 16 07/02/2020 01:51 PM    BUN 23 05/23/2018 03:24 PM    WBC 2 60 (L) 07/02/2020 01:51 PM    WBC 2 9 (L) 05/23/2018 03:24 PM    HGB 12 0 07/02/2020 01:51 PM    HGB 12 8 05/23/2018 03:24 PM    HCT 35 1 (L) 07/02/2020 01:51 PM    HCT 37 8 05/23/2018 03:24 PM    MCV 99 07/02/2020 01:51 PM    MCV 96 5 05/23/2018 03:24 PM     07/02/2020 01:51 PM     05/23/2018 03:24 PM     Temp Readings from Last 3 Encounters:   07/02/20 (!) 97 2 °F (36 2 °C) (Temporal)   02/08/18 (!) 97 2 °F (36 2 °C)     Vancomycin Days of Therapy: 1    Assessment/Plan  The patient is currently on vancomycin utilizing scheduled dosing based on actual body weight  Baseline risks associated with therapy include: pre-existing renal impairment, concomitant nephrotoxic medications, advanced age and dehydration  The patient is currently receiving 750 mg iv q 24hrs and after clinical evaluation will be changed to 500 mg iv q 12 hrs     Pharmacy will also follow closely for s/sx of nephrotoxicity, infusion reactions and appropriateness of therapy  BMP and CBC will be ordered per protocol  Plan for trough as patient approaches steady state, prior to the 4th  dose at approximately 02:15 on 07/04  Due to infection severity, will target a trough of 15-20 (appropriate for most indications)   Pharmacy will continue to follow the patients culture results and clinical progress daily      Aspen Magaña, Pharmacist

## 2020-07-03 NOTE — PLAN OF CARE
Problem: PAIN - ADULT  Goal: Verbalizes/displays adequate comfort level or baseline comfort level  Description  Interventions:  - Encourage patient to monitor pain and request assistance  - Assess pain using appropriate pain scale  - Administer analgesics based on type and severity of pain and evaluate response  - Implement non-pharmacological measures as appropriate and evaluate response  - Consider cultural and social influences on pain and pain management  - Notify physician/advanced practitioner if interventions unsuccessful or patient reports new pain   Outcome: Progressing     Problem: INFECTION - ADULT  Goal: Absence or prevention of progression during hospitalization  Description  INTERVENTIONS:  - Assess and monitor for signs and symptoms of infection  - Monitor lab/diagnostic results  - Monitor all insertion sites, i e  indwelling lines, tubes, and drains  - Monitor endotracheal if appropriate and nasal secretions for changes in amount and color  - [place] appropriate cooling/warming therapies per order  - Administer medications as ordered  - Instruct and encourage patient and family to use good hand hygiene technique      Outcome: Progressing  Goal: Absence of fever/infection during neutropenic period  Description  INTERVENTIONS:  - Monitor WBC     Outcome: Progressing     Problem: SAFETY ADULT  Goal: Patient will remain free of falls  Description  INTERVENTIONS:  - Assess patient frequently for physical needs  -  Identify cognitive and physical deficits and behaviors that affect risk of falls    -  [place] fall precautions as indicated by assessment   - Educate patient/family on patient safety including physical limitations  - Instruct patient to call for assistance with activity based on assessment  - Modify environment to reduce risk of injury  - Consider OT/PT consult to assist with strengthening/mobility   Outcome: Progressing  Goal: Maintain or return to baseline ADL function  Description  INTERVENTIONS:  -  Assess patient's ability to carry out ADLs; assess patient's baseline for ADL function and identify physical deficits which impact ability to perform ADLs (bathing, care of mouth/teeth, toileting, grooming, dressing, etc )  - Assess/evaluate cause of self-care deficits   - Assess range of motion  - Assess patient's mobility; develop plan if impaired  - Assess patient's need for assistive devices and provide as appropriate  - Encourage maximum independence but intervene and supervise when necessary  - Involve family in performance of ADLs  - Assess for home care needs following discharge   - Consider OT consult to assist with ADL evaluation and planning for discharge  - Provide patient education as appropriate   Outcome: Progressing  Goal: Maintain or return mobility status to optimal level  Description  INTERVENTIONS:  - Assess patient's baseline mobility status (ambulation, transfers, stairs, etc )    - Identify cognitive and physical deficits and behaviors that affect mobility  - Identify mobility aids required to assist with transfers and/or ambulation (gait belt, sit-to-stand, lift, walker, cane, etc )  - [place] fall precautions as indicated by assessment  - Record patient progress and toleration of activity level on Mobility SBAR; progress patient to next Phase/Stage  - Instruct patient to call for assistance with activity based on assessment  - Consider rehabilitation consult to assist with strengthening/weightbearing, etc    Outcome: Progressing     Problem: DISCHARGE PLANNING  Goal: Discharge to home or other facility with appropriate resources  Description  INTERVENTIONS:  - Identify barriers to discharge w/patient and caregiver  - Arrange for needed discharge resources and transportation as appropriate  - Identify discharge learning needs (meds, wound care, etc )  - Refer to Case Management Department for coordinating discharge planning if the patient needs post-hospital services based on physician/advanced practitioner order or complex needs related to functional status, cognitive ability, or social support system    Outcome: Progressing     Problem: Knowledge Deficit  Goal: Patient/family/caregiver demonstrates understanding of disease process, treatment plan, medications, and discharge instructions  Description  Complete learning assessment and assess knowledge base    Interventions:  - Provide teaching at level of understanding  - Provide teaching via preferred learning methods   Outcome: Progressing

## 2020-07-03 NOTE — UTILIZATION REVIEW
Initial Clinical Review    Admission: Date/Time/Statement: Admission Orders (From admission, onward)     Ordered        07/02/20 1440  Place in Observation  Once                   Orders Placed This Encounter   Procedures    Place in Observation     Standing Status:   Standing     Number of Occurrences:   1     Order Specific Question:   Admitting Physician     Answer:   Kevin Noyola [[id number]]     Order Specific Question:   Level of Care     Answer:   Med Surg [[id number]]     ED Arrival Information     Expected Arrival Acuity Means of Arrival Escorted By Service Admission Type    - 7/2/2020 12:56 Urgent Walk-In Self General Medicine Urgent    Arrival Complaint    sore on leg        Chief Complaint   Patient presents with    Wound Infection     left ankle wound from dog crate, about a month ago  Was being treated by her PCP for cellulitis     Assessment/Plan:   Mrs Faisal Sadler is an 81 yo female who presents to the ED from home with c/o worsening cellulitis of LLE with pain, increased swelling, worsending erythema  A dog crate fell on her leg about a month ago  She took Bactrim x 7 days and completed that 2 weeks ago with some initial improvement  In the ED the scab over the area was removed and purulent drainage with foul smell was drained  Wound culture sent  Started on IV antibiotics in ED  She is admitted to OBSERVATION status with Cellulits of LLE with failed OP therapy - wound and blood cultures, IV antibiotics, local wound care  Leukopenia - chronic, trend CBC        ED Triage Vitals [07/02/20 1305]   Temperature Pulse Respirations Blood Pressure SpO2   98 °F (36 7 °C) 87 18 146/84 97 %      Temp Source Heart Rate Source Patient Position - Orthostatic VS BP Location FiO2 (%)   Temporal Monitor Lying Left arm --      Pain Score       1        Wt Readings from Last 1 Encounters:   07/02/20 51 6 kg (113 lb 13 9 oz)     Additional Vital Signs:   07/03/20 0737  97 4 °F (36 3 °C)Abnormal   78  16  137/69  97 %  None (Room air)  Lying   07/02/20 2342  97 1 °F (36 2 °C)Abnormal   83  16  152/67  94 %  None (Room air)  Lying   07/02/20 1601  --  --  --  --  --  None (Room air)  --   07/02/20 1523  97 2 °F (36 2 °C)Abnormal   81  18  167/75  97 %  None (Room air)  Lying     Pertinent Labs/Diagnostic Test Results:     7/2 CT LLE  - 1  No evidence of deep infection or osteomyelitis   2  Skin irregularity and soft tissue swelling overlying the distal fibula       Results from last 7 days   Lab Units 07/03/20  0536 07/02/20  1351   WBC Thousand/uL 3 30* 2 60*   HEMOGLOBIN g/dL 12 3 12 0   HEMATOCRIT % 35 8* 35 1*   PLATELETS Thousands/uL 188 190   NEUTROS ABS Thousands/µL 2 60  --    TOTAL NEUT ABS Thousand/uL  --  1 56*         Results from last 7 days   Lab Units 07/03/20  0536 07/02/20  1351   SODIUM mmol/L 142 140   POTASSIUM mmol/L 3 5 3 6   CHLORIDE mmol/L 106 105   CO2 mmol/L 30 25   ANION GAP mmol/L 6 10   BUN mg/dL 15 16   CREATININE mg/dL 0 72 0 66   EGFR ml/min/1 73sq m 75 79   CALCIUM mg/dL 9 2 9 4     Results from last 7 days   Lab Units 07/02/20  1351   AST U/L 28   ALT U/L 16   ALK PHOS U/L 50*   TOTAL PROTEIN g/dL 6 5   ALBUMIN g/dL 4 0   TOTAL BILIRUBIN mg/dL 0 70         Results from last 7 days   Lab Units 07/03/20  0536 07/02/20  1351   GLUCOSE RANDOM mg/dL 88 87     Results from last 7 days   Lab Units 07/02/20  1400   LACTIC ACID mmol/L 0 6     Results from last 7 days   Lab Units 07/02/20  1400 07/02/20  1351   BLOOD CULTURE   --  Received in Microbiology Lab  Culture in Progress  Received in Microbiology Lab  Culture in Progress     GRAM STAIN RESULT  No Polys or Bacteria seen  --      ED Treatment:   Medication Administration from 07/02/2020 1255 to 07/02/2020 1520    Date/Time Order Dose Route Action   07/02/2020 1355 cefepime (MAXIPIME) IVPB (premix) 2,000 mg 50 mL 2,000 mg Intravenous New Bag   07/02/2020 1445 vancomycin (VANCOCIN) IVPB (premix) 750 mg 150 mL 750 mg Intravenous New Bag   07/02/2020 5526 sodium chloride 0 9 % bolus 500 mL 500 mL Intravenous New Bag   07/02/2020 1456 iohexol (OMNIPAQUE) 350 MG/ML injection (MULTI-DOSE) 100 mL 100 mL Intravenous Given        History reviewed  No pertinent past medical history  Present on Admission:  **None**      Admitting Diagnosis: Cellulitis [L03 90]     Age/Sex: 80 y o  female     Admission Orders:  Scheduled Medications:    Medications:  cefazolin 2,000 mg Intravenous Q8H   enoxaparin 40 mg Subcutaneous Daily   neomycin-bacitracin-polymyxin b 1 small application Topical Daily   vancomycin 10 mg/kg Intravenous Q12H     Continuous IV Infusions:     PRN Meds:    acetaminophen 650 mg Oral 4x Daily PRN     SCDs  OOB as thelma   Wound care to LLE with saline and moi  Regular diet   IP CONSULT TO CASE MANAGEMENT  IP CONSULT TO PHARMACY    Network Utilization Review Department  [email or web address]  ATTENTION: Please call with any questions or concerns to [phone] and carefully listen to the prompts so that you are directed to the right person  All voicemails are confidential   Kristin Macedo all requests for admission clinical reviews, approved or denied determinations and any other requests to dedicated fax number below belonging to the campus where the patient is receiving treatment   List of dedicated fax numbers for the Facilities:  [address] DENIALS (Administrative/Medical Necessity) [phone]   [address] (Maternity/NICU/Pediatrics) [phone]   Murphy Dhillon [phone]   [facility] [phone]   Devon Minor [phone]   [place] includes the [facility] [phone]   [address] [phone]   [facility]  [phone]   [address] [facility] [address]  [address] [facility] [address] [facility] [phone]

## 2020-07-03 NOTE — PLAN OF CARE
Problem: PAIN - ADULT  Goal: Verbalizes/displays adequate comfort level or baseline comfort level  Description  Interventions:  - Encourage patient to monitor pain and request assistance  - Assess pain using appropriate pain scale  - Administer analgesics based on type and severity of pain and evaluate response  - Implement non-pharmacological measures as appropriate and evaluate response  - Consider cultural and social influences on pain and pain management  - Notify physician/advanced practitioner if interventions unsuccessful or patient reports new pain   7/3/2020 0341 by María Espinoza RN  Outcome: Progressing  7/3/2020 0340 by María Espinoza RN  Reactivated     Problem: INFECTION - ADULT  Goal: Absence or prevention of progression during hospitalization  Description  INTERVENTIONS:  - Assess and monitor for signs and symptoms of infection  - Monitor lab/diagnostic results  - Monitor all insertion sites, i e  indwelling lines, tubes, and drains  - Monitor endotracheal if appropriate and nasal secretions for changes in amount and color  - [place] appropriate cooling/warming therapies per order  - Administer medications as ordered  - Instruct and encourage patient and family to use good hand hygiene technique      7/3/2020 0341 by María Espinoza RN  Outcome: Progressing  7/3/2020 0340 by María Espinoza RN  Reactivated  Goal: Absence of fever/infection during neutropenic period  Description  INTERVENTIONS:  - Monitor WBC     7/3/2020 0341 by María Espinoza RN  Outcome: Progressing  7/3/2020 0340 by María Espinoza RN  Reactivated     Problem: SAFETY ADULT  Goal: Patient will remain free of falls  Description  INTERVENTIONS:  - Assess patient frequently for physical needs  -  Identify cognitive and physical deficits and behaviors that affect risk of falls    -  [place] fall precautions as indicated by assessment   - Educate patient/family on patient safety including physical limitations  - Instruct patient to call for assistance with activity based on assessment  - Modify environment to reduce risk of injury  - Consider OT/PT consult to assist with strengthening/mobility   7/3/2020 0341 by Ankita Rivera RN  Outcome: Progressing  7/3/2020 0340 by Ankita Rivera RN  Reactivated  Goal: Maintain or return to baseline ADL function  Description  INTERVENTIONS:  -  Assess patient's ability to carry out ADLs; assess patient's baseline for ADL function and identify physical deficits which impact ability to perform ADLs (bathing, care of mouth/teeth, toileting, grooming, dressing, etc )  - Assess/evaluate cause of self-care deficits   - Assess range of motion  - Assess patient's mobility; develop plan if impaired  - Assess patient's need for assistive devices and provide as appropriate  - Encourage maximum independence but intervene and supervise when necessary  - Involve family in performance of ADLs  - Assess for home care needs following discharge   - Consider OT consult to assist with ADL evaluation and planning for discharge  - Provide patient education as appropriate   7/3/2020 0341 by Ankita Rivera RN  Outcome: Progressing  7/3/2020 0340 by Ankita Rivera RN  Reactivated  Goal: Maintain or return mobility status to optimal level  Description  INTERVENTIONS:  - Assess patient's baseline mobility status (ambulation, transfers, stairs, etc )    - Identify cognitive and physical deficits and behaviors that affect mobility  - Identify mobility aids required to assist with transfers and/or ambulation (gait belt, sit-to-stand, lift, walker, cane, etc )  - [place] fall precautions as indicated by assessment  - Record patient progress and toleration of activity level on Mobility SBAR; progress patient to next Phase/Stage  - Instruct patient to call for assistance with activity based on assessment  - Consider rehabilitation consult to assist with strengthening/weightbearing, etc    7/3/2020 0341 by Ankita Rivera RN  Outcome: Progressing  7/3/2020 0340 by Richar Patel RN  Reactivated     Problem: DISCHARGE PLANNING  Goal: Discharge to home or other facility with appropriate resources  Description  INTERVENTIONS:  - Identify barriers to discharge w/patient and caregiver  - Arrange for needed discharge resources and transportation as appropriate  - Identify discharge learning needs (meds, wound care, etc )  - Refer to Case Management Department for coordinating discharge planning if the patient needs post-hospital services based on physician/advanced practitioner order or complex needs related to functional status, cognitive ability, or social support system    7/3/2020 0341 by Richar Patel RN  Outcome: Progressing  7/3/2020 0340 by Richar Patel RN  Reactivated     Problem: Knowledge Deficit  Goal: Patient/family/caregiver demonstrates understanding of disease process, treatment plan, medications, and discharge instructions  Description  Complete learning assessment and assess knowledge base    Interventions:  - Provide teaching at level of understanding  - Provide teaching via preferred learning methods   7/3/2020 0341 by Richar Patel RN  Outcome: Progressing  7/3/2020 0340 by Richar Patel RN  Reactivated

## 2020-07-04 VITALS
BODY MASS INDEX: 19.44 KG/M2 | OXYGEN SATURATION: 96 % | HEART RATE: 65 BPM | DIASTOLIC BLOOD PRESSURE: 63 MMHG | HEIGHT: 64 IN | TEMPERATURE: 98.8 F | SYSTOLIC BLOOD PRESSURE: 130 MMHG | RESPIRATION RATE: 18 BRPM | WEIGHT: 113.87 LBS

## 2020-07-04 LAB
ANION GAP SERPL CALCULATED.3IONS-SCNC: 10 MMOL/L (ref 4–13)
BUN SERPL-MCNC: 15 MG/DL (ref 7–25)
CALCIUM SERPL-MCNC: 9.3 MG/DL (ref 8.6–10.5)
CHLORIDE SERPL-SCNC: 106 MMOL/L (ref 98–107)
CO2 SERPL-SCNC: 27 MMOL/L (ref 21–31)
CREAT SERPL-MCNC: 0.65 MG/DL (ref 0.6–1.2)
ERYTHROCYTE [DISTWIDTH] IN BLOOD BY AUTOMATED COUNT: 13.7 % (ref 11.5–14.5)
GFR SERPL CREATININE-BSD FRML MDRD: 80 ML/MIN/1.73SQ M
GLUCOSE SERPL-MCNC: 85 MG/DL (ref 65–99)
HCT VFR BLD AUTO: 34.5 % (ref 42–47)
HGB BLD-MCNC: 12.2 G/DL (ref 12–16)
MCH RBC QN AUTO: 34.7 PG (ref 26–34)
MCHC RBC AUTO-ENTMCNC: 35.5 G/DL (ref 31–37)
MCV RBC AUTO: 98 FL (ref 81–99)
PLATELET # BLD AUTO: 193 THOUSANDS/UL (ref 149–390)
PMV BLD AUTO: 7.8 FL (ref 8.6–11.7)
POTASSIUM SERPL-SCNC: 3.6 MMOL/L (ref 3.5–5.5)
RBC # BLD AUTO: 3.52 MILLION/UL (ref 3.9–5.2)
SODIUM SERPL-SCNC: 143 MMOL/L (ref 134–143)
VANCOMYCIN TROUGH SERPL-MCNC: 9.7 UG/ML (ref 5–12)
WBC # BLD AUTO: 2.4 THOUSAND/UL (ref 4.8–10.8)

## 2020-07-04 PROCEDURE — 80202 ASSAY OF VANCOMYCIN: CPT | Performed by: NURSE PRACTITIONER

## 2020-07-04 PROCEDURE — 99239 HOSP IP/OBS DSCHRG MGMT >30: CPT | Performed by: NURSE PRACTITIONER

## 2020-07-04 PROCEDURE — 80048 BASIC METABOLIC PNL TOTAL CA: CPT | Performed by: NURSE PRACTITIONER

## 2020-07-04 PROCEDURE — 85027 COMPLETE CBC AUTOMATED: CPT | Performed by: NURSE PRACTITIONER

## 2020-07-04 RX ORDER — DOXYCYCLINE HYCLATE 100 MG/1
100 CAPSULE ORAL EVERY 12 HOURS SCHEDULED
Qty: 20 CAPSULE | Refills: 0 | Status: SHIPPED | OUTPATIENT
Start: 2020-07-04 | End: 2020-07-14

## 2020-07-04 RX ORDER — DOXYCYCLINE HYCLATE 100 MG/1
100 CAPSULE ORAL EVERY 12 HOURS SCHEDULED
Status: DISCONTINUED | OUTPATIENT
Start: 2020-07-04 | End: 2020-07-04 | Stop reason: HOSPADM

## 2020-07-04 RX ORDER — CEFAZOLIN SODIUM 1 G/50ML
1000 SOLUTION INTRAVENOUS EVERY 8 HOURS
Status: COMPLETED | OUTPATIENT
Start: 2020-07-04 | End: 2020-07-04

## 2020-07-04 RX ADMIN — VANCOMYCIN HYDROCHLORIDE 750 MG: 750 INJECTION, SOLUTION INTRAVENOUS at 15:35

## 2020-07-04 RX ADMIN — VANCOMYCIN HYDROCHLORIDE 500 MG: 500 INJECTION, SOLUTION INTRAVENOUS at 02:50

## 2020-07-04 RX ADMIN — CEFAZOLIN SODIUM 1000 MG: 1 SOLUTION INTRAVENOUS at 14:34

## 2020-07-04 RX ADMIN — CEFAZOLIN SODIUM 1000 MG: 1 SOLUTION INTRAVENOUS at 06:31

## 2020-07-04 RX ADMIN — BACITRACIN, NEOMYCIN, POLYMYXIN B 1 SMALL APPLICATION: 400; 3.5; 5 OINTMENT TOPICAL at 10:27

## 2020-07-04 NOTE — DISCHARGE INSTR - AVS FIRST PAGE
Dear Jayme Tovar,     It was our pleasure to care for you here at [facility], [facility]  It is our hope that we were always able to exceed the expected standards for your care during your stay  You were hospitalized due to worsening cellulitis of the left leg  You were cared for on the 1st floor by RAMIRO Weaver with the formerly [facility] who covers for your primary care physician (PCP), Mike Bello MD, while you were hospitalized  If you have any questions or concerns related to this hospitalization, you may contact us at [phone]  For follow up as well as any medication refills, we recommend that you follow up with your primary care physician  A registered nurse will reach out to you by phone within a few days after your discharge to answer any additional questions that you may have after going home  However, at this time we provide for you here, the most important instructions / recommendations at discharge:     · Notable Medication Adjustments -   · Doxycycline- antibiotic  · Testing Required after Discharge -   · Follow-up with PCP for repeat CBC (for low white blood cell counts)  · Important follow up information -   · Follow-up PCP  · Other Instructions -   · Please refer to discharge instruction  · Please review this entire after visit summary as additional general instructions including medication list, appointments, activity, diet, any pertinent wound care, and other additional recommendations from your care team that may be provided for you        Sincerely,     RAMIRO Weaver

## 2020-07-04 NOTE — PLAN OF CARE
Problem: INFECTION - ADULT  Goal: Absence or prevention of progression during hospitalization  Description  INTERVENTIONS:  - Assess and monitor for signs and symptoms of infection  - Monitor lab/diagnostic results  - Monitor all insertion sites, i e  indwelling lines, tubes, and drains  - Monitor endotracheal if appropriate and nasal secretions for changes in amount and color  - [place] appropriate cooling/warming therapies per order  - Administer medications as ordered  - Instruct and encourage patient and family to use good hand hygiene technique      Outcome: Progressing  Goal: Absence of fever/infection during neutropenic period  Description  INTERVENTIONS:  - Monitor WBC     Outcome: Progressing

## 2020-07-04 NOTE — ASSESSMENT & PLAN NOTE
· Failed outpatient therapy  · The patient denies any prior MRSA infection  · The patient received cefepime and vancomycin in the ED; cefepime transitioned to cefazolin     · Blood cultures- no growth to date  · Wound culture shows Staphylococcus species  · Suspected that this could be MRSA with purulent drainage on admission   · Clinically is improving   · Will transition to PO doxycycline for total of 10 days  · The patient is afebrile  · Local wound care  · Recommend to follow-up with PCP for full resolution

## 2020-07-04 NOTE — PROGRESS NOTES
Vancomycin Assessment    Patricia Richmond is a 80 y o  female who is currently receiving vancomycin 500 mg iv q 12 hours for Pneumonia     Relevant clinical data and objective history reviewed:  Creatinine   Date Value Ref Range Status   07/03/2020 0 72 0 60 - 1 20 mg/dL Final     Comment:     Standardized to IDMS reference method   07/02/2020 0 66 0 60 - 1 20 mg/dL Final     Comment:     Standardized to IDMS reference method   02/05/2019 0 81 0 60 - 1 20 mg/dL Final     Comment:     Standardized to IDMS reference method   05/23/2018 0 80 0 6 - 1 2 MG/DL Final     Comment:     IDMS method performed  The drugs Metamizole, Sulfasalazine, and Sulfapyridine may interfere and  result in false low results  /51 (BP Location: Left arm)   Pulse 64   Temp 97 5 °F (36 4 °C) (Temporal)   Resp 16   Ht 5' 4" (1 626 m)   Wt 51 6 kg (113 lb 13 9 oz)   SpO2 97%   BMI 19 55 kg/m²   I/O last 3 completed shifts: In: 1450 [P O :900; IV Piggyback:550]  Out: -   Lab Results   Component Value Date/Time    BUN 15 07/03/2020 05:36 AM    BUN 23 05/23/2018 03:24 PM    WBC 3 30 (L) 07/03/2020 05:36 AM    WBC 2 9 (L) 05/23/2018 03:24 PM    HGB 12 3 07/03/2020 05:36 AM    HGB 12 8 05/23/2018 03:24 PM    HCT 35 8 (L) 07/03/2020 05:36 AM    HCT 37 8 05/23/2018 03:24 PM    MCV 99 07/03/2020 05:36 AM    MCV 96 5 05/23/2018 03:24 PM     07/03/2020 05:36 AM     05/23/2018 03:24 PM     Temp Readings from Last 3 Encounters:   07/04/20 97 5 °F (36 4 °C) (Temporal)   02/08/18 (!) 97 2 °F (36 2 °C)     Vancomycin Days of Therapy: 3    Assessment/Plan  The patient is currently on vancomycin utilizing scheduled dosing  Baseline risks associated with therapy include: pre-existing renal impairment, concomitant nephrotoxic medications and advanced age    The patient is receiving 500 mg iv q 12 hours with the most recent vancomycin level being at steady-state and sub-therapeutic (9 7)based on a goal of 15-20 (appropriate for most indications) ; therefore, after clinical evaluation will be changed to 750 mg iv q 12 hours   Pharmacy will continue to follow closely for s/sx of nephrotoxicity, infusion reactions and appropriateness of therapy  BMP and CBC will be ordered per protocol  Plan for trough as patient approaches steady state, prior to the 3rd  dose at approximately 14:30 on 7/5/2020  Pharmacy will continue to follow the patients culture results and clinical progress daily      Kianna Oliva, Pharmacist

## 2020-07-04 NOTE — DISCHARGE SUMMARY
Discharge- Yasmin Romero 12/29/1931, 80 y o  female MRN: [id number]    Unit/Bed#: -01 Encounter: [id number]    Primary Care Provider: Lucia Shell MD   Date and time admitted to hospital: 7/2/2020  1:02 PM        * Cellulitis of left lower extremity  Assessment & Plan  · Failed outpatient therapy  · The patient denies any prior MRSA infection  · The patient received cefepime and vancomycin in the ED; cefepime transitioned to cefazolin  · Blood cultures- no growth to date  · Wound culture shows Staphylococcus species  · Suspected that this could be MRSA with purulent drainage on admission   · Clinically is improving   · Will transition to PO doxycycline for total of 10 days  · The patient is afebrile  · Local wound care  · Recommend to follow-up with PCP for full resolution      Leukopenia  Assessment & Plan  · This appears to be chronic for patient  · Recommend to follow-up PCP for repeat blood work            Discharging Physician / Practitioner: Lorna Morton [facility]  PCP: Lucia Shell MD  Admission Date:   Admission Orders (From admission, onward)     Ordered        07/03/20 1837  Inpatient Admission  Once         07/02/20 1440  Place in Observation  Once                   Discharge Date: 07/04/20    Resolved Problems  Date Reviewed: 7/4/2020    None          Consultations During Hospital Stay:  · None     Procedures Performed:   · None     Significant Findings / Test Results:   · CT LLE 1   No evidence of deep infection or osteomyelitis  2   Skin irregularity and soft tissue swelling overlying the distal fibula    Incidental Findings:   · None      Test Results Pending at Discharge (will require follow up):    · Final blood cultures and wound culture      Outpatient Tests Requested:  · Follow up with PCP     Complications:     None     Reason for Admission:  Worsening cellulitis of left lower extremity; failed outpatient therapy    Hospital Course:     Yasmin Romero is a 80 y o  female patient who originally presented to the hospital on 7/2/2020 due to worsening cellulitis of the left lower extremity  Please refer to H&P for initial presenting complaint  Brief the patient was admitted after she noticed worsening of cellulitis of her left lower extremity  The patient sustained an injury on the left lower extremity after a dog crate fell on her leg  She was prescribed 7 day course of Bactrim which she finished approximately 2 weeks ago  Initially she states that the site looks much improved however on the day of admission the patient noticed worsening redness and warmth with increasing pain  Upon arrival to the ED, there was purulent drainage from the scabbed area on the wound  Patient subsequently was admitted  She received cefepime and vancomycin in the ED  Antibiotics were transitioned to vancomycin and Ancef while in house  Currently wound culture shows Staphylococcus spicies  Blood cultures are negative to date  Clinically is much improved and the patient would like to go home  I will transition the patient to doxycycline for 10 more days  Recommend to follow-up with her PCP for full resolution  Please see above list of diagnoses and related plan for additional information  Condition at Discharge: good     Discharge Day Visit / Exam:     Subjective:  Feeling much better  Would like to go home  Vitals: Blood Pressure: 130/63 (07/04/20 0729)  Pulse: 65 (07/04/20 0729)  Temperature: 98 8 °F (37 1 °C) (07/04/20 0729)  Temp Source: Temporal (07/04/20 0729)  Respirations: 18 (07/04/20 0729)  Height: 5' 4" (162 6 cm) (07/02/20 1523)  Weight - Scale: 51 6 kg (113 lb 13 9 oz) (07/02/20 1523)  SpO2: 96 % (07/04/20 0729)  Exam:   Physical Exam   Constitutional: She is oriented to person, place, and time  She appears well-developed and well-nourished  No distress  HENT:   Head: Normocephalic and atraumatic     Mouth/Throat: Oropharynx is clear and moist    Eyes: Pupils are equal, round, and reactive to light  Conjunctivae and EOM are normal    Neck: Normal range of motion  Neck supple  No thyromegaly present  Cardiovascular: Normal rate, regular rhythm and normal heart sounds  Exam reveals no gallop and no friction rub  No murmur heard  Pulmonary/Chest: Effort normal and breath sounds normal  She has no wheezes  She has no rales  Abdominal: Soft  Bowel sounds are normal  She exhibits no distension  There is no tenderness  There is no rebound  Musculoskeletal: Normal range of motion  She exhibits edema (trace LLE)  She exhibits no tenderness or deformity  Neurological: She is alert and oriented to person, place, and time  No cranial nerve deficit  Skin: Skin is warm and dry  No rash noted  There is erythema  Psychiatric: She has a normal mood and affect  Her behavior is normal  Judgment and thought content normal    Vitals reviewed  Discussion with Family: none present during exam     Discharge instructions/Information to patient and family:   See after visit summary for information provided to patient and family  Provisions for Follow-Up Care:  See after visit summary for information related to follow-up care and any pertinent home health orders  Disposition:     Home    For Discharges to [facility] SNF:   · Not Applicable to this Patient - Not Applicable to this Patient    Planned Readmission:    No      Discharge Statement:  I spent 38 minutes discharging the patient  This time was spent on the day of discharge  I had direct contact with the patient on the day of discharge  Greater than 50% of the total time was spent examining patient, answering all patient questions, arranging and discussing plan of care with patient as well as directly providing post-discharge instructions  Additional time then spent on discharge activities  Discharge Medications:  See after visit summary for reconciled discharge medications provided to patient and family        ** Please Note: This note has been constructed using a voice recognition system **

## 2020-07-04 NOTE — UTILIZATION REVIEW
07/03/20 1838  Inpatient Admission Once     Transfer Service: General Medicine       Question Answer Comment   Admitting Physician Soumya Redd    Level of Care Med Surg    Estimated length of stay More than 2 Midnights    Certification I certify that inpatient services are medically necessary for this patient for a duration of greater than two midnights  See H&P and MD Progress Notes for additional information about the patient's course of treatment

## 2020-07-04 NOTE — NURSING NOTE
Patient discharged home  IV removed  Verbalizes understanding of discharge instructions  Patient has all belongings  Escorted out by [place]

## 2020-07-04 NOTE — PLAN OF CARE
Problem: PAIN - ADULT  Goal: Verbalizes/displays adequate comfort level or baseline comfort level  Description  Interventions:  - Encourage patient to monitor pain and request assistance  - Assess pain using appropriate pain scale  - Administer analgesics based on type and severity of pain and evaluate response  - Implement non-pharmacological measures as appropriate and evaluate response  - Consider cultural and social influences on pain and pain management  - Notify physician/advanced practitioner if interventions unsuccessful or patient reports new pain   Outcome: Progressing     Problem: INFECTION - ADULT  Goal: Absence or prevention of progression during hospitalization  Description  INTERVENTIONS:  - Assess and monitor for signs and symptoms of infection  - Monitor lab/diagnostic results  - Monitor all insertion sites, i e  indwelling lines, tubes, and drains  - Monitor endotracheal if appropriate and nasal secretions for changes in amount and color  - [place] appropriate cooling/warming therapies per order  - Administer medications as ordered  - Instruct and encourage patient and family to use good hand hygiene technique      Outcome: Progressing  Goal: Absence of fever/infection during neutropenic period  Description  INTERVENTIONS:  - Monitor WBC     Outcome: Progressing     Problem: SAFETY ADULT  Goal: Patient will remain free of falls  Description  INTERVENTIONS:  - Assess patient frequently for physical needs  -  Identify cognitive and physical deficits and behaviors that affect risk of falls    -  [place] fall precautions as indicated by assessment   - Educate patient/family on patient safety including physical limitations  - Instruct patient to call for assistance with activity based on assessment  - Modify environment to reduce risk of injury  - Consider OT/PT consult to assist with strengthening/mobility   Outcome: Progressing  Goal: Maintain or return to baseline ADL function  Description  INTERVENTIONS:  -  Assess patient's ability to carry out ADLs; assess patient's baseline for ADL function and identify physical deficits which impact ability to perform ADLs (bathing, care of mouth/teeth, toileting, grooming, dressing, etc )  - Assess/evaluate cause of self-care deficits   - Assess range of motion  - Assess patient's mobility; develop plan if impaired  - Assess patient's need for assistive devices and provide as appropriate  - Encourage maximum independence but intervene and supervise when necessary  - Involve family in performance of ADLs  - Assess for home care needs following discharge   - Consider OT consult to assist with ADL evaluation and planning for discharge  - Provide patient education as appropriate   Outcome: Progressing  Goal: Maintain or return mobility status to optimal level  Description  INTERVENTIONS:  - Assess patient's baseline mobility status (ambulation, transfers, stairs, etc )    - Identify cognitive and physical deficits and behaviors that affect mobility  - Identify mobility aids required to assist with transfers and/or ambulation (gait belt, sit-to-stand, lift, walker, cane, etc )  - [place] fall precautions as indicated by assessment  - Record patient progress and toleration of activity level on Mobility SBAR; progress patient to next Phase/Stage  - Instruct patient to call for assistance with activity based on assessment  - Consider rehabilitation consult to assist with strengthening/weightbearing, etc    Outcome: Progressing     Problem: DISCHARGE PLANNING  Goal: Discharge to home or other facility with appropriate resources  Description  INTERVENTIONS:  - Identify barriers to discharge w/patient and caregiver  - Arrange for needed discharge resources and transportation as appropriate  - Identify discharge learning needs (meds, wound care, etc )  - Refer to Case Management Department for coordinating discharge planning if the patient needs post-hospital services based on physician/advanced practitioner order or complex needs related to functional status, cognitive ability, or social support system    Outcome: Progressing     Problem: Knowledge Deficit  Goal: Patient/family/caregiver demonstrates understanding of disease process, treatment plan, medications, and discharge instructions  Description  Complete learning assessment and assess knowledge base  Interventions:  - Provide teaching at level of understanding  - Provide teaching via preferred learning methods   Outcome: Progressing     Problem: Potential for Falls  Goal: Patient will remain free of falls  Description  INTERVENTIONS:  - Assess patient frequently for physical needs  -  Identify cognitive and physical deficits and behaviors that affect risk of falls    -  [place] fall precautions as indicated by assessment   - Educate patient/family on patient safety including physical limitations  - Instruct patient to call for assistance with activity based on assessment  - Modify environment to reduce risk of injury  - Consider OT/PT consult to assist with strengthening/mobility   Outcome: Progressing

## 2020-07-04 NOTE — CONSULTS
Vancomycin IV Pharmacy-to-Dose Consultation    Jaja Corral is an 80 y o  female who was receiving Vancomycin IV with management by the Pharmacy Consult service for treatment of skin-soft tissue infection    The patient's Vancomycin therapy has been discontinued  Thank you for allowing us to take part in this patient's care  Pharmacy will sign-off now; please call or re-consult if there are any questions          Marilyn Arteaga [facility]

## 2020-07-05 LAB
BACTERIA WND AEROBE CULT: ABNORMAL
GRAM STN SPEC: ABNORMAL

## 2020-07-07 LAB
BACTERIA BLD CULT: NORMAL
BACTERIA BLD CULT: NORMAL

## 2020-07-13 NOTE — PHYSICIAN ADVISOR
Current patient class: Inpatient  The patient is currently on Hospital Day: 3 at [address]      The patient was admitted to the hospital at [place] [address] on 7/3/20 for the following diagnosis:  Cellulitis [L03 [id number]]       There is documentation in the medical record of an expected length of stay of at least 2 midnights  The patient is therefore expected to satisfy the 2 midnight benchmark and given the 2 midnight presumption is appropriate for INPATIENT ADMISSION  Given this expectation of a satisfying stay, CMS instructs us that the patient is most often appropriate for inpatient admission under part A provided medical necessity is documented in the chart  After review of the relevant documentation, labs, vital signs and test results, the patient is appropriate for INPATIENT ADMISSION  Admission to the hospital as an inpatient is a complex decision making process which requires the practitioner to consider the patients presenting complaint, history and physical examination and all relevant testing  With this in mind, in this case, the patient was deemed appropriate for INPATIENT ADMISSION  After review of the documentation and testing available at the time of the admission I concur with this clinical determination of medical necessity  Rationale is as follows: The patient is a 80 yrs old Female who presented to the ED at 7/2/2020  1:02 PM with a chief complaint of Wound Infection (left ankle wound from dog crate, about a month ago  Was being treated by her PCP for cellulitis)  Patient was put on cefepime vancomycin on admission  The patient was then changed to cefazolin and vancomycin  On day 2 of admission the patient still remained on IV antibiotics with still noted erythema of the left leg    Given the need for ongoing treatment with IV antibiotics the patient did cross the 2 midnight benchmark as set by Medicare and is inpatient status appropriate based on medical necessity  The patients vitals on arrival were ED Triage Vitals [07/02/20 1305]   Temperature Pulse Respirations Blood Pressure SpO2   98 °F (36 7 °C) 87 18 146/84 97 %      Temp Source Heart Rate Source Patient Position - Orthostatic VS BP Location FiO2 (%)   Temporal Monitor Lying Left arm --      Pain Score       1           History reviewed  No pertinent past medical history  Past Surgical History:   Procedure Laterality Date    BUNIONECTOMY      JOINT REPLACEMENT             Consults have been placed to:   IP CONSULT TO PHARMACY  IP CONSULT TO PHARMACY    Vitals:    07/03/20 0737 07/03/20 1536 07/04/20 0022 07/04/20 0729   BP: 137/69 117/56 109/51 130/63   BP Location: Left arm Left arm Left arm Left arm   Pulse: 78 70 64 65   Resp: 16 16 16 18   Temp: (!) 97 4 °F (36 3 °C) 98 2 °F (36 8 °C) 97 5 °F (36 4 °C) 98 8 °F (37 1 °C)   TempSrc: Temporal Temporal Temporal Temporal   SpO2: 97% 97% 97% 96%   Weight:       Height:           Most recent labs:    No results for input(s): WBC, HGB, HCT, PLT, K, NA, CALCIUM, BUN, CREATININE, LIPASE, AMYLASE, INR, TROPONINI, CKTOTAL, AST, ALT, ALKPHOS, BILITOT in the last 72 hours  Scheduled Meds:  Continuous Infusions:  No current facility-administered medications for this encounter  PRN Meds:      Surgical procedures (if appropriate):

## 2020-10-03 ENCOUNTER — APPOINTMENT (EMERGENCY)
Dept: RADIOLOGY | Facility: HOSPITAL | Age: 85
End: 2020-10-03
Payer: MEDICARE

## 2020-10-03 ENCOUNTER — HOSPITAL ENCOUNTER (EMERGENCY)
Facility: HOSPITAL | Age: 85
Discharge: HOME | End: 2020-10-03
Attending: EMERGENCY MEDICINE | Admitting: EMERGENCY MEDICINE
Payer: MEDICARE

## 2020-10-03 VITALS
BODY MASS INDEX: 19.12 KG/M2 | TEMPERATURE: 97.6 F | HEART RATE: 75 BPM | OXYGEN SATURATION: 96 % | HEIGHT: 64 IN | WEIGHT: 112 LBS | RESPIRATION RATE: 18 BRPM

## 2020-10-03 DIAGNOSIS — M25.569 KNEE PAIN: Primary | ICD-10-CM

## 2020-10-03 PROCEDURE — 99282 EMERGENCY DEPT VISIT SF MDM: CPT | Performed by: PHYSICIAN ASSISTANT

## 2020-10-03 PROCEDURE — 73564 X-RAY EXAM KNEE 4 OR MORE: CPT

## 2020-10-03 PROCEDURE — 99283 EMERGENCY DEPT VISIT LOW MDM: CPT

## 2020-10-03 RX ORDER — IBUPROFEN 600 MG/1
600 TABLET ORAL ONCE
Status: COMPLETED | OUTPATIENT
Start: 2020-10-03 | End: 2020-10-03

## 2020-10-03 RX ADMIN — IBUPROFEN 600 MG: 600 TABLET, FILM COATED ORAL at 14:56

## 2020-11-03 ENCOUNTER — OFFICE VISIT (OUTPATIENT)
Dept: OBGYN CLINIC | Facility: CLINIC | Age: 85
End: 2020-11-03
Payer: MEDICARE

## 2020-11-03 VITALS
DIASTOLIC BLOOD PRESSURE: 82 MMHG | BODY MASS INDEX: 19.81 KG/M2 | WEIGHT: 116 LBS | SYSTOLIC BLOOD PRESSURE: 127 MMHG | TEMPERATURE: 96.9 F | HEART RATE: 84 BPM | HEIGHT: 64 IN

## 2020-11-03 DIAGNOSIS — S80.01XA CONTUSION OF RIGHT KNEE, INITIAL ENCOUNTER: Primary | ICD-10-CM

## 2020-11-03 PROCEDURE — 99213 OFFICE O/P EST LOW 20 MIN: CPT | Performed by: ORTHOPAEDIC SURGERY

## 2020-11-08 ENCOUNTER — HOSPITAL ENCOUNTER (EMERGENCY)
Facility: HOSPITAL | Age: 85
Discharge: HOME | End: 2020-11-08
Attending: EMERGENCY MEDICINE | Admitting: EMERGENCY MEDICINE
Payer: MEDICARE

## 2020-11-08 VITALS
BODY MASS INDEX: 18.95 KG/M2 | RESPIRATION RATE: 16 BRPM | OXYGEN SATURATION: 98 % | HEART RATE: 84 BPM | HEIGHT: 64 IN | WEIGHT: 111 LBS | SYSTOLIC BLOOD PRESSURE: 147 MMHG | DIASTOLIC BLOOD PRESSURE: 72 MMHG | TEMPERATURE: 97.4 F

## 2020-11-08 DIAGNOSIS — R19.7 DIARRHEA: Primary | ICD-10-CM

## 2020-11-08 LAB
ALBUMIN SERPL BCP-MCNC: 4.1 G/DL (ref 3.5–5.7)
ALP SERPL-CCNC: 66 U/L (ref 55–165)
ALT SERPL W P-5'-P-CCNC: 9 U/L (ref 7–52)
ANION GAP SERPL CALCULATED.3IONS-SCNC: 7 MMOL/L (ref 4–13)
AST SERPL W P-5'-P-CCNC: 23 U/L (ref 13–39)
BILIRUB SERPL-MCNC: 0.8 MG/DL (ref 0.2–1)
BUN SERPL-MCNC: 13 MG/DL (ref 7–25)
CALCIUM SERPL-MCNC: 9.5 MG/DL (ref 8.6–10.5)
CHLORIDE SERPL-SCNC: 105 MMOL/L (ref 98–107)
CO2 SERPL-SCNC: 29 MMOL/L (ref 21–31)
CREAT SERPL-MCNC: 0.75 MG/DL (ref 0.6–1.2)
ERYTHROCYTE [DISTWIDTH] IN BLOOD BY AUTOMATED COUNT: 13.2 % (ref 11.5–14.5)
GFR SERPL CREATININE-BSD FRML MDRD: 71 ML/MIN/1.73SQ M
GLUCOSE SERPL-MCNC: 84 MG/DL (ref 65–99)
HCT VFR BLD AUTO: 35.7 % (ref 42–47)
HGB BLD-MCNC: 12.4 G/DL (ref 12–16)
LIPASE SERPL-CCNC: 26 U/L (ref 11–82)
LYMPHOCYTES # BLD AUTO: 0.62 THOUSAND/UL (ref 0.6–4.47)
LYMPHOCYTES # BLD AUTO: 24 % (ref 20–51)
MAGNESIUM SERPL-MCNC: 1.8 MG/DL (ref 1.9–2.7)
MCH RBC QN AUTO: 33.8 PG (ref 26–34)
MCHC RBC AUTO-ENTMCNC: 34.6 G/DL (ref 31–37)
MCV RBC AUTO: 98 FL (ref 81–99)
MONOCYTES # BLD AUTO: 0.21 THOUSAND/UL (ref 0–1.22)
MONOCYTES NFR BLD AUTO: 8 % (ref 4–12)
NEUTS SEG # BLD: 1.77 THOUSAND/UL (ref 1.81–6.82)
NEUTS SEG NFR BLD AUTO: 68 % (ref 42–75)
PLATELET # BLD AUTO: 185 THOUSANDS/UL (ref 149–390)
PLATELET BLD QL SMEAR: ADEQUATE
PMV BLD AUTO: 7 FL (ref 8.6–11.7)
POTASSIUM SERPL-SCNC: 3.1 MMOL/L (ref 3.5–5.5)
PROT SERPL-MCNC: 6.3 G/DL (ref 6.4–8.9)
RBC # BLD AUTO: 3.66 MILLION/UL (ref 3.9–5.2)
RBC MORPH BLD: NORMAL
SODIUM SERPL-SCNC: 141 MMOL/L (ref 134–143)
TOTAL CELLS COUNTED SPEC: 100
WBC # BLD AUTO: 2.6 THOUSAND/UL (ref 4.8–10.8)

## 2020-11-08 PROCEDURE — 85027 COMPLETE CBC AUTOMATED: CPT | Performed by: EMERGENCY MEDICINE

## 2020-11-08 PROCEDURE — 83690 ASSAY OF LIPASE: CPT | Performed by: EMERGENCY MEDICINE

## 2020-11-08 PROCEDURE — 83735 ASSAY OF MAGNESIUM: CPT | Performed by: EMERGENCY MEDICINE

## 2020-11-08 PROCEDURE — 99284 EMERGENCY DEPT VISIT MOD MDM: CPT | Performed by: EMERGENCY MEDICINE

## 2020-11-08 PROCEDURE — 80053 COMPREHEN METABOLIC PANEL: CPT | Performed by: EMERGENCY MEDICINE

## 2020-11-08 PROCEDURE — 36415 COLL VENOUS BLD VENIPUNCTURE: CPT | Performed by: EMERGENCY MEDICINE

## 2020-11-08 PROCEDURE — 85007 BL SMEAR W/DIFF WBC COUNT: CPT | Performed by: EMERGENCY MEDICINE

## 2020-11-08 PROCEDURE — 99284 EMERGENCY DEPT VISIT MOD MDM: CPT

## 2020-11-08 RX ORDER — POTASSIUM CHLORIDE 20 MEQ/1
40 TABLET, EXTENDED RELEASE ORAL ONCE
Status: COMPLETED | OUTPATIENT
Start: 2020-11-08 | End: 2020-11-08

## 2020-11-08 RX ORDER — LOPERAMIDE HYDROCHLORIDE 2 MG/1
2 CAPSULE ORAL 2 TIMES DAILY PRN
Qty: 12 CAPSULE | Refills: 0 | Status: SHIPPED | OUTPATIENT
Start: 2020-11-08 | End: 2021-03-16

## 2020-11-08 RX ADMIN — POTASSIUM CHLORIDE 40 MEQ: 1500 TABLET, EXTENDED RELEASE ORAL at 15:18

## 2020-11-18 ENCOUNTER — OFFICE VISIT (OUTPATIENT)
Dept: SURGERY | Facility: CLINIC | Age: 85
End: 2020-11-18
Payer: MEDICARE

## 2020-11-18 VITALS
TEMPERATURE: 97.6 F | WEIGHT: 112 LBS | RESPIRATION RATE: 18 BRPM | HEART RATE: 92 BPM | HEIGHT: 64 IN | DIASTOLIC BLOOD PRESSURE: 62 MMHG | SYSTOLIC BLOOD PRESSURE: 130 MMHG | BODY MASS INDEX: 19.12 KG/M2

## 2020-11-18 DIAGNOSIS — R19.8 ALTERNATING CONSTIPATION AND DIARRHEA: Primary | ICD-10-CM

## 2020-11-18 DIAGNOSIS — R63.4 WEIGHT LOSS, UNINTENTIONAL: ICD-10-CM

## 2020-11-18 PROCEDURE — 99203 OFFICE O/P NEW LOW 30 MIN: CPT | Performed by: SURGERY

## 2021-01-20 DIAGNOSIS — Z23 ENCOUNTER FOR IMMUNIZATION: ICD-10-CM

## 2021-01-26 ENCOUNTER — IMMUNIZATIONS (OUTPATIENT)
Dept: FAMILY MEDICINE CLINIC | Facility: HOSPITAL | Age: 86
End: 2021-01-26

## 2021-01-26 DIAGNOSIS — Z23 ENCOUNTER FOR IMMUNIZATION: Primary | ICD-10-CM

## 2021-01-26 PROCEDURE — 91301 SARS-COV-2 / COVID-19 MRNA VACCINE (MODERNA) 100 MCG: CPT

## 2021-01-26 PROCEDURE — 0011A SARS-COV-2 / COVID-19 MRNA VACCINE (MODERNA) 100 MCG: CPT

## 2021-03-03 ENCOUNTER — IMMUNIZATIONS (OUTPATIENT)
Dept: FAMILY MEDICINE CLINIC | Facility: HOSPITAL | Age: 86
End: 2021-03-03

## 2021-03-03 DIAGNOSIS — Z23 ENCOUNTER FOR IMMUNIZATION: Primary | ICD-10-CM

## 2021-03-03 PROCEDURE — 91301 SARS-COV-2 / COVID-19 MRNA VACCINE (MODERNA) 100 MCG: CPT

## 2021-03-03 PROCEDURE — 0012A SARS-COV-2 / COVID-19 MRNA VACCINE (MODERNA) 100 MCG: CPT

## 2021-03-08 ENCOUNTER — OFFICE VISIT (OUTPATIENT)
Dept: SURGERY | Facility: CLINIC | Age: 86
End: 2021-03-08
Payer: MEDICARE

## 2021-03-08 VITALS
HEART RATE: 74 BPM | TEMPERATURE: 96.9 F | RESPIRATION RATE: 18 BRPM | SYSTOLIC BLOOD PRESSURE: 122 MMHG | BODY MASS INDEX: 18.78 KG/M2 | WEIGHT: 110 LBS | HEIGHT: 64 IN | DIASTOLIC BLOOD PRESSURE: 62 MMHG

## 2021-03-08 DIAGNOSIS — K59.04 CHRONIC IDIOPATHIC CONSTIPATION: Primary | ICD-10-CM

## 2021-03-08 PROCEDURE — 99214 OFFICE O/P EST MOD 30 MIN: CPT | Performed by: SURGERY

## 2021-03-08 NOTE — PROGRESS NOTES
Assessment/Plan:    Chronic idiopathic constipation  The patient is a pleasant 29-year-old female returning for 3 month follow-up regarding her history of intermittent constipation  The patient reports stable symptoms  No improvement or progression and her constipation which is frequent problem for the patient  She does follow a high-fiber diet  On physical examination she is pleasant well-nourished well-developed elderly female  She is in no acute distress  Awake alert oriented  Pleasant competent reliable as a historian  Her abdomen is soft and nontender to percussion and palpation in 4 quadrants  No guarding rebound or peritoneal signs  Patient has expressed a desire definitive investigations into her symptom complex  The discuss the merits of for additional colonoscopy versus is virtual CT colonoscopy  Unfortunately the patient's insurance does not out allow for virtual colonoscopy therefore traditional colonoscopy will be performed  Technical details of the procedures well as the benefits and risks were reviewed with the patient including risks related to anesthesia, bleeding and gastrointestinal tract injury necessitating emergent surgical intervention  All questions answered to the satisfaction of the patient the patient wishes to proceed with the procedure  Subjective:      Patient ID: Goran Hoffmann is a 80 y o  female  Patient is here today for a 3 month follow up diarrhea,constipation and weight loss  Stated that she is still having constipation and is trying to eat a lot of fiber  Patient would like to discuss having a colonsocopy  No fever or chills  Karoline Grove MA          Review of Systems   Constitutional: Negative for chills and fever  HENT: Negative for ear pain and sore throat  Eyes: Negative for pain and visual disturbance  Respiratory: Negative for cough and shortness of breath  Cardiovascular: Negative for chest pain and palpitations  Gastrointestinal: Negative for abdominal pain and vomiting  Genitourinary: Negative for dysuria and hematuria  Musculoskeletal: Negative for arthralgias and back pain  Skin: Negative for color change and rash  Neurological: Negative for seizures and syncope  All other systems reviewed and are negative  Objective:      /62 (BP Location: Left arm, Patient Position: Sitting, Cuff Size: Standard)   Pulse 74   Temp (!) 96 9 °F (36 1 °C) (Temporal)   Resp 18   Ht 5' 4" (1 626 m)   Wt 49 9 kg (110 lb)   BMI 18 88 kg/m²          Physical Exam  Constitutional:       Appearance: She is well-developed  HENT:      Head: Normocephalic and atraumatic  Eyes:      Conjunctiva/sclera: Conjunctivae normal       Pupils: Pupils are equal, round, and reactive to light  Neck:      Musculoskeletal: Normal range of motion and neck supple  Cardiovascular:      Rate and Rhythm: Normal rate and regular rhythm  Pulmonary:      Effort: Pulmonary effort is normal       Breath sounds: Normal breath sounds  Abdominal:      General: Bowel sounds are normal       Palpations: Abdomen is soft  Comments: Soft  Nontender nondistended  No guarding rebound or peritoneal signs  No masses noted no hernias appreciated  Musculoskeletal: Normal range of motion  Skin:     General: Skin is warm and dry  Neurological:      Mental Status: She is alert and oriented to person, place, and time  Psychiatric:         Behavior: Behavior normal          Thought Content:  Thought content normal          Judgment: Judgment normal

## 2021-03-08 NOTE — ASSESSMENT & PLAN NOTE
The patient is a pleasant 70-year-old female returning for 3 month follow-up regarding her history of intermittent constipation  The patient reports stable symptoms  No improvement or progression and her constipation which is frequent problem for the patient  She does follow a high-fiber diet  On physical examination she is pleasant well-nourished well-developed elderly female  She is in no acute distress  Awake alert oriented  Pleasant competent reliable as a historian  Her abdomen is soft and nontender to percussion and palpation in 4 quadrants  No guarding rebound or peritoneal signs  Patient has expressed a desire definitive investigations into her symptom complex  The discuss the merits of for additional colonoscopy versus is virtual CT colonoscopy  Unfortunately the patient's insurance does not out allow for virtual colonoscopy therefore traditional colonoscopy will be performed  Technical details of the procedures well as the benefits and risks were reviewed with the patient including risks related to anesthesia, bleeding and gastrointestinal tract injury necessitating emergent surgical intervention  All questions answered to the satisfaction of the patient the patient wishes to proceed with the procedure

## 2021-03-08 NOTE — LETTER
March 8, 2021     Glena Goldmann, MD  [address] [place] [address]    Patient: Gricel Maradiaga   YOB: 1931   Date of Visit: 3/8/2021       Dear Dr Mike Falcon: Thank you for referring Gricel Maradiaga to me for evaluation  Below are my notes for this consultation  If you have questions, please do not hesitate to call me  I look forward to following your patient along with you  Sincerely,        Parker Trevizo MD        CC: No Recipients  Parker Trevizo MD  3/8/2021  6:36 PM  Incomplete  Assessment/Plan:    Chronic idiopathic constipation  The patient is a pleasant 61-year-old female returning for 3 month follow-up regarding her history of intermittent constipation  The patient reports stable symptoms  No improvement or progression and her constipation which is frequent problem for the patient  She does follow a high-fiber diet  On physical examination she is pleasant well-nourished well-developed elderly female  She is in no acute distress  Awake alert oriented  Pleasant competent reliable as a historian  Her abdomen is soft and nontender to percussion and palpation in 4 quadrants  No guarding rebound or peritoneal signs  Patient has expressed a desire definitive investigations into her symptom complex  The discuss the merits of for additional colonoscopy versus is virtual CT colonoscopy  Given the patient's advanced age and they desire to avoid gastrointestinal tract injury with a traditional endoscopic procedure we elected to undergo virtual CT colonoscopy  Order entered into the computer record  I look for to seeing her back after the study  We will then formulate definitive treatment plan and additional therapeutic recommendations  Subjective:      Patient ID: Gricel Maradiaga is a 80 y o  female  Patient is here today for a 3 month follow up diarrhea,constipation and weight loss   Stated that she is still having constipation and is trying to eat a lot of fiber  Patient would like to discuss having a colonsocopy  No fever or chills  Karoline Grove MA          Review of Systems   Constitutional: Negative for chills and fever  HENT: Negative for ear pain and sore throat  Eyes: Negative for pain and visual disturbance  Respiratory: Negative for cough and shortness of breath  Cardiovascular: Negative for chest pain and palpitations  Gastrointestinal: Negative for abdominal pain and vomiting  Genitourinary: Negative for dysuria and hematuria  Musculoskeletal: Negative for arthralgias and back pain  Skin: Negative for color change and rash  Neurological: Negative for seizures and syncope  All other systems reviewed and are negative  Objective:      /62 (BP Location: Left arm, Patient Position: Sitting, Cuff Size: Standard)   Pulse 74   Temp (!) 96 9 °F (36 1 °C) (Temporal)   Resp 18   Ht 5' 4" (1 626 m)   Wt 49 9 kg (110 lb)   BMI 18 88 kg/m²          Physical Exam  Constitutional:       Appearance: She is well-developed  HENT:      Head: Normocephalic and atraumatic  Eyes:      Conjunctiva/sclera: Conjunctivae normal       Pupils: Pupils are equal, round, and reactive to light  Neck:      Musculoskeletal: Normal range of motion and neck supple  Cardiovascular:      Rate and Rhythm: Normal rate and regular rhythm  Pulmonary:      Effort: Pulmonary effort is normal       Breath sounds: Normal breath sounds  Abdominal:      General: Bowel sounds are normal       Palpations: Abdomen is soft  Comments: Soft  Nontender nondistended  No guarding rebound or peritoneal signs  No masses noted no hernias appreciated  Musculoskeletal: Normal range of motion  Skin:     General: Skin is warm and dry  Neurological:      Mental Status: She is alert and oriented to person, place, and time  Psychiatric:         Behavior: Behavior normal          Thought Content:  Thought content normal          Judgment: Judgment normal

## 2021-03-10 DIAGNOSIS — K59.04 CHRONIC IDIOPATHIC CONSTIPATION: ICD-10-CM

## 2021-03-10 DIAGNOSIS — K59.04 CHRONIC IDIOPATHIC CONSTIPATION: Primary | ICD-10-CM

## 2021-03-10 RX ORDER — SODIUM, POTASSIUM,MAG SULFATES 17.5-3.13G
SOLUTION, RECONSTITUTED, ORAL ORAL
Qty: 354 ML | Refills: 0 | Status: SHIPPED | OUTPATIENT
Start: 2021-03-10 | End: 2021-03-10

## 2021-03-10 RX ORDER — SODIUM, POTASSIUM,MAG SULFATES 17.5-3.13G
1 SOLUTION, RECONSTITUTED, ORAL ORAL ONCE
Qty: 1 BOTTLE | Refills: 0 | Status: SHIPPED | OUTPATIENT
Start: 2021-03-10 | End: 2021-03-16

## 2021-03-10 RX ORDER — SODIUM, POTASSIUM,MAG SULFATES 17.5-3.13G
1 SOLUTION, RECONSTITUTED, ORAL ORAL ONCE
Qty: 1 BOTTLE | Refills: 0 | Status: SHIPPED | OUTPATIENT
Start: 2021-03-10 | End: 2021-03-10

## 2021-03-10 NOTE — H&P
Assessment/Plan:    Chronic idiopathic constipation  The patient is a pleasant 80-year-old female returning for 3 month follow-up regarding her history of intermittent constipation  The patient reports stable symptoms  No improvement or progression and her constipation which is frequent problem for the patient  She does follow a high-fiber diet  On physical examination she is pleasant well-nourished well-developed elderly female  She is in no acute distress  Awake alert oriented  Pleasant competent reliable as a historian  Her abdomen is soft and nontender to percussion and palpation in 4 quadrants  No guarding rebound or peritoneal signs  Patient has expressed a desire definitive investigations into her symptom complex  The discuss the merits of for additional colonoscopy versus is virtual CT colonoscopy  Unfortunately the patient's insurance does not out allow for virtual colonoscopy therefore traditional colonoscopy will be performed  Technical details of the procedures well as the benefits and risks were reviewed with the patient including risks related to anesthesia, bleeding and gastrointestinal tract injury necessitating emergent surgical intervention  All questions answered to the satisfaction of the patient the patient wishes to proceed with the procedure  Subjective:      Patient ID: Yolande Madrigal is a 80 y o  female  Patient is here today for a 3 month follow up diarrhea,constipation and weight loss  Stated that she is still having constipation and is trying to eat a lot of fiber  Patient would like to discuss having a colonsocopy  No fever or chills  Karoline Grove MA          Review of Systems   Constitutional: Negative for chills and fever  HENT: Negative for ear pain and sore throat  Eyes: Negative for pain and visual disturbance  Respiratory: Negative for cough and shortness of breath  Cardiovascular: Negative for chest pain and palpitations  Gastrointestinal: Negative for abdominal pain and vomiting  Genitourinary: Negative for dysuria and hematuria  Musculoskeletal: Negative for arthralgias and back pain  Skin: Negative for color change and rash  Neurological: Negative for seizures and syncope  All other systems reviewed and are negative  Objective:      /62 (BP Location: Left arm, Patient Position: Sitting, Cuff Size: Standard)   Pulse 74   Temp (!) 96 9 °F (36 1 °C) (Temporal)   Resp 18   Ht 5' 4" (1 626 m)   Wt 49 9 kg (110 lb)   BMI 18 88 kg/m²          Physical Exam  Constitutional:       Appearance: She is well-developed  HENT:      Head: Normocephalic and atraumatic  Eyes:      Conjunctiva/sclera: Conjunctivae normal       Pupils: Pupils are equal, round, and reactive to light  Neck:      Musculoskeletal: Normal range of motion and neck supple  Cardiovascular:      Rate and Rhythm: Normal rate and regular rhythm  Pulmonary:      Effort: Pulmonary effort is normal       Breath sounds: Normal breath sounds  Abdominal:      General: Bowel sounds are normal       Palpations: Abdomen is soft  Comments: Soft  Nontender nondistended  No guarding rebound or peritoneal signs  No masses noted no hernias appreciated  Musculoskeletal: Normal range of motion  Skin:     General: Skin is warm and dry  Neurological:      Mental Status: She is alert and oriented to person, place, and time  Psychiatric:         Behavior: Behavior normal          Thought Content:  Thought content normal          Judgment: Judgment normal

## 2021-03-10 NOTE — ADDENDUM NOTE
Addended by: Graham Carranza on: 3/10/2021 01:36 PM     Modules accepted: Orders, Level of Service, SmartSet

## 2021-03-10 NOTE — H&P (VIEW-ONLY)
Assessment/Plan:    Chronic idiopathic constipation  The patient is a pleasant 80-year-old female returning for 3 month follow-up regarding her history of intermittent constipation  The patient reports stable symptoms  No improvement or progression and her constipation which is frequent problem for the patient  She does follow a high-fiber diet  On physical examination she is pleasant well-nourished well-developed elderly female  She is in no acute distress  Awake alert oriented  Pleasant competent reliable as a historian  Her abdomen is soft and nontender to percussion and palpation in 4 quadrants  No guarding rebound or peritoneal signs  Patient has expressed a desire definitive investigations into her symptom complex  The discuss the merits of for additional colonoscopy versus is virtual CT colonoscopy  Unfortunately the patient's insurance does not out allow for virtual colonoscopy therefore traditional colonoscopy will be performed  Technical details of the procedures well as the benefits and risks were reviewed with the patient including risks related to anesthesia, bleeding and gastrointestinal tract injury necessitating emergent surgical intervention  All questions answered to the satisfaction of the patient the patient wishes to proceed with the procedure  Subjective:      Patient ID: Lala Godfrey is a 80 y o  female  Patient is here today for a 3 month follow up diarrhea,constipation and weight loss  Stated that she is still having constipation and is trying to eat a lot of fiber  Patient would like to discuss having a colonsocopy  No fever or chills  Karoline Grove MA          Review of Systems   Constitutional: Negative for chills and fever  HENT: Negative for ear pain and sore throat  Eyes: Negative for pain and visual disturbance  Respiratory: Negative for cough and shortness of breath  Cardiovascular: Negative for chest pain and palpitations  Gastrointestinal: Negative for abdominal pain and vomiting  Genitourinary: Negative for dysuria and hematuria  Musculoskeletal: Negative for arthralgias and back pain  Skin: Negative for color change and rash  Neurological: Negative for seizures and syncope  All other systems reviewed and are negative  Objective:      /62 (BP Location: Left arm, Patient Position: Sitting, Cuff Size: Standard)   Pulse 74   Temp (!) 96 9 °F (36 1 °C) (Temporal)   Resp 18   Ht 5' 4" (1 626 m)   Wt 49 9 kg (110 lb)   BMI 18 88 kg/m²          Physical Exam  Constitutional:       Appearance: She is well-developed  HENT:      Head: Normocephalic and atraumatic  Eyes:      Conjunctiva/sclera: Conjunctivae normal       Pupils: Pupils are equal, round, and reactive to light  Neck:      Musculoskeletal: Normal range of motion and neck supple  Cardiovascular:      Rate and Rhythm: Normal rate and regular rhythm  Pulmonary:      Effort: Pulmonary effort is normal       Breath sounds: Normal breath sounds  Abdominal:      General: Bowel sounds are normal       Palpations: Abdomen is soft  Comments: Soft  Nontender nondistended  No guarding rebound or peritoneal signs  No masses noted no hernias appreciated  Musculoskeletal: Normal range of motion  Skin:     General: Skin is warm and dry  Neurological:      Mental Status: She is alert and oriented to person, place, and time  Psychiatric:         Behavior: Behavior normal          Thought Content:  Thought content normal          Judgment: Judgment normal

## 2021-03-15 ENCOUNTER — ANESTHESIA EVENT (OUTPATIENT)
Dept: PERIOP | Facility: HOSPITAL | Age: 86
End: 2021-03-15

## 2021-03-16 ENCOUNTER — ANESTHESIA (OUTPATIENT)
Dept: PERIOP | Facility: HOSPITAL | Age: 86
End: 2021-03-16

## 2021-03-16 ENCOUNTER — NURSE TRIAGE (OUTPATIENT)
Dept: OTHER | Facility: OTHER | Age: 86
End: 2021-03-16

## 2021-03-16 ENCOUNTER — HOSPITAL ENCOUNTER (OUTPATIENT)
Dept: PERIOP | Facility: HOSPITAL | Age: 86
Setting detail: OUTPATIENT SURGERY
Discharge: HOME | End: 2021-03-16
Attending: SURGERY | Admitting: SURGERY
Payer: MEDICARE

## 2021-03-16 VITALS
OXYGEN SATURATION: 98 % | TEMPERATURE: 97.9 F | DIASTOLIC BLOOD PRESSURE: 61 MMHG | HEART RATE: 70 BPM | SYSTOLIC BLOOD PRESSURE: 126 MMHG | RESPIRATION RATE: 18 BRPM

## 2021-03-16 DIAGNOSIS — K59.04 CHRONIC IDIOPATHIC CONSTIPATION: ICD-10-CM

## 2021-03-16 PROBLEM — E44.1 PROTEIN-CALORIE MALNUTRITION, MILD (HCC): Status: ACTIVE | Noted: 2021-03-16

## 2021-03-16 PROBLEM — D12.8 ADENOMATOUS RECTAL POLYP: Status: ACTIVE | Noted: 2021-03-16

## 2021-03-16 PROCEDURE — 45384 COLONOSCOPY W/LESION REMOVAL: CPT | Performed by: SURGERY

## 2021-03-16 PROCEDURE — 88305 TISSUE EXAM BY PATHOLOGIST: CPT | Performed by: PATHOLOGY

## 2021-03-16 RX ORDER — MULTIVITAMIN
1 CAPSULE ORAL DAILY
COMMUNITY

## 2021-03-16 RX ORDER — CHLORAL HYDRATE 500 MG
1000 CAPSULE ORAL DAILY
COMMUNITY

## 2021-03-16 RX ORDER — LIDOCAINE HYDROCHLORIDE 10 MG/ML
0.5 INJECTION, SOLUTION EPIDURAL; INFILTRATION; INTRACAUDAL; PERINEURAL ONCE AS NEEDED
Status: DISCONTINUED | OUTPATIENT
Start: 2021-03-16 | End: 2021-03-20 | Stop reason: HOSPADM

## 2021-03-16 RX ORDER — PROPOFOL 10 MG/ML
INJECTION, EMULSION INTRAVENOUS AS NEEDED
Status: DISCONTINUED | OUTPATIENT
Start: 2021-03-16 | End: 2021-03-16

## 2021-03-16 RX ORDER — SODIUM CHLORIDE, SODIUM LACTATE, POTASSIUM CHLORIDE, CALCIUM CHLORIDE 600; 310; 30; 20 MG/100ML; MG/100ML; MG/100ML; MG/100ML
125 INJECTION, SOLUTION INTRAVENOUS CONTINUOUS
Status: DISCONTINUED | OUTPATIENT
Start: 2021-03-16 | End: 2021-03-16

## 2021-03-16 RX ORDER — PROPOFOL 10 MG/ML
INJECTION, EMULSION INTRAVENOUS CONTINUOUS PRN
Status: DISCONTINUED | OUTPATIENT
Start: 2021-03-16 | End: 2021-03-16

## 2021-03-16 RX ORDER — PROPRANOLOL/HYDROCHLOROTHIAZID 40 MG-25MG
2 TABLET ORAL DAILY
COMMUNITY

## 2021-03-16 RX ADMIN — SODIUM CHLORIDE, SODIUM LACTATE, POTASSIUM CHLORIDE, AND CALCIUM CHLORIDE 125 ML/HR: .6; .31; .03; .02 INJECTION, SOLUTION INTRAVENOUS at 07:13

## 2021-03-16 RX ADMIN — PROPOFOL 100 MCG/KG/MIN: 10 INJECTION, EMULSION INTRAVENOUS at 08:09

## 2021-03-16 RX ADMIN — PROPOFOL 30 MG: 10 INJECTION, EMULSION INTRAVENOUS at 08:12

## 2021-03-16 RX ADMIN — PROPOFOL 50 MG: 10 INJECTION, EMULSION INTRAVENOUS at 08:09

## 2021-03-16 NOTE — TELEPHONE ENCOUNTER
Reason for Disposition   [1] Caller has URGENT medication question about med that PCP or specialist prescribed AND [2] triager unable to answer question    Answer Assessment - Initial Assessment Questions  1  NAME of MEDICATION: "What medicine are you calling about?"     Prep for colonoscopy   2  QUESTION: "What is your question?"      "I took my 2nd prep too early  3  PRESCRIBING HCP: "Who prescribed it?" Reason: if prescribed by specialist, call should be referred to that group  [facility]  4   SYMPTOMS: "Do you have any symptoms?"      None    Protocols used: MEDICATION QUESTION CALL-ADULT-

## 2021-03-16 NOTE — DISCHARGE INSTRUCTIONS
Colonoscopy   WHAT YOU NEED TO KNOW:   A colonoscopy is a procedure to examine the inside of your colon (intestine) with a scope  Polyps or tissue growths may have been removed during your colonoscopy  It is normal to feel bloated and to have some abdominal discomfort  You should be passing gas  If you have hemorrhoids or you had polyps removed, you may have a small amount of bleeding  DISCHARGE INSTRUCTIONS:   Call your doctor if:   · You have a large amount of bright red blood in your bowel movements  · Your abdomen is hard and firm and you have severe pain  · You have sudden trouble breathing  · You develop a rash or hives  · You have a fever within 24 hours of your procedure  · You have not had a bowel movement for 3 days after your procedure  · You have questions or concerns about your condition or care  After your colonoscopy:   · Do not lift, strain, or run  for 3 days  · Rest as much as possible  You have been given medicine to relax you  Do not  drive or make important decisions for at least 24 hours  Return to your normal activity as directed  · Relieve gas and discomfort from bloating  by lying on your left side with a heating pad on your abdomen  You may need to take short walks to help the gas move out  Eat small meals until bloating is relieved  If you had polyps removed: For 7 days after your procedure:  · Do not  take aspirin  · Do not  go on long car rides  Help prevent constipation:   · Eat a variety of healthy foods  Healthy foods include fruit, vegetables, whole-grain breads, low-fat dairy products, beans, lean meat, and fish  Ask if you need to be on a special diet  Your healthcare provider may recommend that you eat high-fiber foods such as cooked beans  Fiber helps you have regular bowel movements  · Drink liquids as directed  Adults should drink between 9 and 13 eight-ounce cups of liquid every day  Ask what amount is best for you   For most people, good liquids to drink are water, juice, and milk  · Exercise as directed  Talk to your healthcare provider about the best exercise plan for you  Exercise can help prevent constipation, decrease your blood pressure and improve your health  Follow up with your healthcare provider as directed:  Write down your questions so you remember to ask them during your visits  © Copyright [address] Information is for End User's use only and may not be sold, redistributed or otherwise used for commercial purposes  All illustrations and images included in CareNotes® are the copyrighted property of A D A M , Inc  or [address] [place], [place] [address]nini Frank   The above information is an  only  It is not intended as medical advice for individual conditions or treatments  Talk to your doctor, nurse or pharmacist before following any medical regimen to see if it is safe and effective for you

## 2021-03-16 NOTE — TELEPHONE ENCOUNTER
TT Out-Pt's daughter called in because she gave the patient her 2nd dose of Suprep bowel prep for her colonoscopy too early  Pt was supposed to drink the second dose at 0230 and she drank it at 2330  Pt's daughter wants to know if that is okay? TT in-Totally fine  Thank you  I spoke with Jeannie Figueroa, relayed message from the provider

## 2021-03-16 NOTE — ANESTHESIA POSTPROCEDURE EVALUATION
Post-Op Assessment Note    CV Status:  Stable  Pain Score: 0    Pain management: adequate     Mental Status:  Arousable   Hydration Status:  Stable   PONV Controlled:  None   Airway Patency:  Patent      Post Op Vitals Reviewed: Yes      Staff: CRNA         No complications documented      BP   83/44   Temp     Pulse  55   Resp   16   SpO2   98

## 2021-03-16 NOTE — INTERVAL H&P NOTE
H&P reviewed  After examining the patient I find no changes in the patients condition since the H&P had been written      Vitals:    03/16/21 0701   BP: 132/71   Pulse: 94   Resp: 18   Temp: (!) 96 8 °F (36 °C)   SpO2: 98%

## 2021-03-16 NOTE — TELEPHONE ENCOUNTER
Regarding: clarification took prep to early  ----- Message from [facility] sent at 3/16/2021 12:12 AM EDT -----  " I accidentally had my mom drink her 2nd part of prep for her colonoscopy 7 hours before and it was instructed to be done at 5 hours and I am hoping this is okay or what needs to be done now "

## 2021-03-16 NOTE — ANESTHESIA PREPROCEDURE EVALUATION
Procedure:  COLONOSCOPY    Relevant Problems   Other   (+) Chronic idiopathic constipation   (+) Protein-calorie malnutrition, mild (HCC)   (+) Weight loss, unintentional        Physical Exam    Airway    Mallampati score: I  TM Distance: >3 FB  Neck ROM: full     Dental   upper dentures and lower dentures,     Cardiovascular      Pulmonary      Other Findings  Bottom teeth are bonded/caps, none are loose or chipped      Anesthesia Plan  ASA Score- 2     Anesthesia Type- IV sedation with anesthesia with ASA Monitors  Additional Monitors:   Airway Plan:           Plan Factors-Exercise tolerance (METS): >4 METS  Chart reviewed  Existing labs reviewed  Patient is not a current smoker  Patient did not smoke on day of surgery  Obstructive sleep apnea risk education given perioperatively  Induction- intravenous  Postoperative Plan-     Informed Consent- Anesthetic plan and risks discussed with patient  I personally reviewed this patient with the CRNA  Discussed and agreed on the Anesthesia Plan with the CRNA  Karley Sandhu

## 2021-03-18 ENCOUNTER — TELEPHONE (OUTPATIENT)
Dept: SURGERY | Facility: CLINIC | Age: 86
End: 2021-03-18

## 2021-03-29 ENCOUNTER — OFFICE VISIT (OUTPATIENT)
Dept: SURGERY | Facility: CLINIC | Age: 86
End: 2021-03-29

## 2021-03-29 DIAGNOSIS — D12.8 ADENOMATOUS RECTAL POLYP: Primary | ICD-10-CM

## 2021-03-29 DIAGNOSIS — K59.04 CHRONIC IDIOPATHIC CONSTIPATION: ICD-10-CM

## 2021-03-29 PROCEDURE — 99024 POSTOP FOLLOW-UP VISIT: CPT | Performed by: SURGERY

## 2021-03-29 NOTE — LETTER
March 29, 2021     Janie Pizarro MD  [address] [place] [address]    Patient: Samantha Brooks   YOB: 1931   Date of Visit: 3/29/2021       Dear Dr Glinda Klinefelter: Thank you for referring Samantha Brooks to me for evaluation  Below are my notes for this consultation  If you have questions, please do not hesitate to call me  I look forward to following your patient along with you  Sincerely,        Alicia Mederos MD        CC: No Recipients  Alicia Mederos MD  3/29/2021  9:53 PM  Incomplete  Assessment/Plan:    Adenomatous rectal polyp  Patient returns the office status post colonoscopy to review the results of the pathology  I was happy to report that she had a benign tubular adenoma the poses no danger to the patient  On physical examination the patient is well appearing  Awake alert  Competent reliable  Her abdomen is soft and nontender to palpation  Copies of the colonoscopy report and pathology were provided to the patient for her permanent record  All questions answered to her satisfaction  Given the patient's good health and advanced age no additional investigations or interventions are indicated at the present time  No additional colonoscopies are indicated  Chronic idiopathic constipation  Patient reports resolution of her constipation following the colonoscopy  She was no new questions or concerns at the time of today's visit  I have asked the patient to return to the office at any future with questions or concerns  Diagnoses and all orders for this visit:    Adenomatous rectal polyp    Chronic idiopathic constipation          Subjective:      Patient ID: Samantha Brooks is a 80 y o  female      HPI    The following portions of the patient's history were reviewed and updated as appropriate: allergies, current medications, past family history, past medical history, past social history, past surgical history and problem list     Review of Systems   Constitutional: Negative for chills and fever  HENT: Negative for ear pain and sore throat  Eyes: Negative for pain and visual disturbance  Respiratory: Negative for cough and shortness of breath  Cardiovascular: Negative for chest pain and palpitations  Gastrointestinal: Negative for abdominal pain and vomiting  Genitourinary: Negative for dysuria and hematuria  Musculoskeletal: Negative for arthralgias and back pain  Skin: Negative for color change and rash  Neurological: Negative for seizures and syncope  All other systems reviewed and are negative  Objective: There were no vitals taken for this visit  Physical Exam  Constitutional:       Appearance: She is well-developed  HENT:      Head: Normocephalic and atraumatic  Eyes:      Conjunctiva/sclera: Conjunctivae normal       Pupils: Pupils are equal, round, and reactive to light  Neck:      Musculoskeletal: Normal range of motion and neck supple  Cardiovascular:      Rate and Rhythm: Normal rate and regular rhythm  Pulmonary:      Effort: Pulmonary effort is normal       Breath sounds: Normal breath sounds  Abdominal:      General: Bowel sounds are normal       Palpations: Abdomen is soft  Musculoskeletal: Normal range of motion  Skin:     General: Skin is warm and dry  Neurological:      Mental Status: She is alert and oriented to person, place, and time  Psychiatric:         Behavior: Behavior normal          Thought Content:  Thought content normal          Judgment: Judgment normal

## 2021-03-30 NOTE — ASSESSMENT & PLAN NOTE
Patient returns the office status post colonoscopy to review the results of the pathology  I was happy to report that she had a benign tubular adenoma the poses no danger to the patient  On physical examination the patient is well appearing  Awake alert  Competent reliable  Her abdomen is soft and nontender to palpation  Copies of the colonoscopy report and pathology were provided to the patient for her permanent record  All questions answered to her satisfaction  Given the patient's good health and advanced age no additional investigations or interventions are indicated at the present time  No additional colonoscopies are indicated

## 2021-03-30 NOTE — ASSESSMENT & PLAN NOTE
Patient reports resolution of her constipation following the colonoscopy  She was no new questions or concerns at the time of today's visit  I have asked the patient to return to the office at any future with questions or concerns

## 2021-03-30 NOTE — PROGRESS NOTES
Assessment/Plan:    Adenomatous rectal polyp  Patient returns the office status post colonoscopy to review the results of the pathology  I was happy to report that she had a benign tubular adenoma the poses no danger to the patient  On physical examination the patient is well appearing  Awake alert  Competent reliable  Her abdomen is soft and nontender to palpation  Copies of the colonoscopy report and pathology were provided to the patient for her permanent record  All questions answered to her satisfaction  Given the patient's good health and advanced age no additional investigations or interventions are indicated at the present time  No additional colonoscopies are indicated  Chronic idiopathic constipation  Patient reports resolution of her constipation following the colonoscopy  She was no new questions or concerns at the time of today's visit  I have asked the patient to return to the office at any future with questions or concerns  Diagnoses and all orders for this visit:    Adenomatous rectal polyp    Chronic idiopathic constipation          Subjective:      Patient ID: Facundo Valentine is a 80 y o  female  HPI    The following portions of the patient's history were reviewed and updated as appropriate: allergies, current medications, past family history, past medical history, past social history, past surgical history and problem list     Review of Systems   Constitutional: Negative for chills and fever  HENT: Negative for ear pain and sore throat  Eyes: Negative for pain and visual disturbance  Respiratory: Negative for cough and shortness of breath  Cardiovascular: Negative for chest pain and palpitations  Gastrointestinal: Negative for abdominal pain and vomiting  Genitourinary: Negative for dysuria and hematuria  Musculoskeletal: Negative for arthralgias and back pain  Skin: Negative for color change and rash     Neurological: Negative for seizures and syncope  All other systems reviewed and are negative  Objective: There were no vitals taken for this visit  Physical Exam  Constitutional:       Appearance: She is well-developed  HENT:      Head: Normocephalic and atraumatic  Eyes:      Conjunctiva/sclera: Conjunctivae normal       Pupils: Pupils are equal, round, and reactive to light  Neck:      Musculoskeletal: Normal range of motion and neck supple  Cardiovascular:      Rate and Rhythm: Normal rate and regular rhythm  Pulmonary:      Effort: Pulmonary effort is normal       Breath sounds: Normal breath sounds  Abdominal:      General: Bowel sounds are normal       Palpations: Abdomen is soft  Musculoskeletal: Normal range of motion  Skin:     General: Skin is warm and dry  Neurological:      Mental Status: She is alert and oriented to person, place, and time  Psychiatric:         Behavior: Behavior normal          Thought Content:  Thought content normal          Judgment: Judgment normal

## 2022-05-11 ENCOUNTER — HOSPITAL ENCOUNTER (OUTPATIENT)
Dept: RADIOLOGY | Facility: HOSPITAL | Age: 87
Discharge: HOME | End: 2022-05-11
Attending: PODIATRIST
Payer: MEDICARE

## 2022-05-11 DIAGNOSIS — M77.42 METATARSALGIA OF LEFT FOOT: ICD-10-CM

## 2022-05-11 PROCEDURE — 73630 X-RAY EXAM OF FOOT: CPT

## 2022-07-05 ENCOUNTER — APPOINTMENT (EMERGENCY)
Dept: CT IMAGING | Facility: HOSPITAL | Age: 87
End: 2022-07-05
Payer: MEDICARE

## 2022-07-05 ENCOUNTER — HOSPITAL ENCOUNTER (EMERGENCY)
Facility: HOSPITAL | Age: 87
Discharge: HOME | End: 2022-07-05
Attending: EMERGENCY MEDICINE | Admitting: EMERGENCY MEDICINE
Payer: MEDICARE

## 2022-07-05 VITALS
RESPIRATION RATE: 15 BRPM | SYSTOLIC BLOOD PRESSURE: 124 MMHG | DIASTOLIC BLOOD PRESSURE: 60 MMHG | TEMPERATURE: 98.7 F | HEART RATE: 82 BPM | OXYGEN SATURATION: 98 % | BODY MASS INDEX: 18.88 KG/M2 | WEIGHT: 110 LBS

## 2022-07-05 DIAGNOSIS — J01.90 SINUSITIS, ACUTE: ICD-10-CM

## 2022-07-05 DIAGNOSIS — R51.9 HEADACHE: Primary | ICD-10-CM

## 2022-07-05 PROCEDURE — 99284 EMERGENCY DEPT VISIT MOD MDM: CPT

## 2022-07-05 PROCEDURE — 87636 SARSCOV2 & INF A&B AMP PRB: CPT | Performed by: EMERGENCY MEDICINE

## 2022-07-05 PROCEDURE — 99284 EMERGENCY DEPT VISIT MOD MDM: CPT | Performed by: EMERGENCY MEDICINE

## 2022-07-05 PROCEDURE — 70450 CT HEAD/BRAIN W/O DYE: CPT

## 2022-07-05 PROCEDURE — G1004 CDSM NDSC: HCPCS

## 2022-07-05 RX ORDER — AMOXICILLIN 500 MG/1
500 CAPSULE ORAL 3 TIMES DAILY
Qty: 30 CAPSULE | Refills: 0 | Status: SHIPPED | OUTPATIENT
Start: 2022-07-05 | End: 2022-07-15

## 2022-07-05 NOTE — ED PROVIDER NOTES
History  Chief Complaint   Patient presents with    Headache     Pt reports an intermittent headache x2 weeks  Pt denies sensitivity to light or sound  Pt voices no changes to vision  Pt reports she may have a sinus infection does she has not seen a provider for  Pt reports nasal congestion and sneezing     HPI    This is a very pleasant nontoxic 81 y/o white female presents the emergency department intermittent headaches over the last 14 days  Patient denies any sensitivity to sound or light  No change in voice or vision  Patient reports she has some nasal congestion over the last week or so  Patient denies any cough, but does report some mild nasal congestion and sneezing  Patient tested for any COVID-19 at home 1 week ago with negative  Patient did fall hit her head a G2 half weeks ago, it no LOC, no blood thinners  Prior to Admission Medications   Prescriptions Last Dose Informant Patient Reported? Taking? Multiple Vitamin (multivitamin) capsule   Yes No   Sig: Take 1 capsule by mouth daily   Omega-3 Fatty Acids (fish oil) 1,000 mg   Yes No   Sig: Take 1,000 mg by mouth daily   Turmeric 500 MG CAPS   Yes No   Sig: Take 2 capsules by mouth daily      Facility-Administered Medications: None       No past medical history on file  Past Surgical History:   Procedure Laterality Date    BUNIONECTOMY      JOINT REPLACEMENT Right     Hip       Family History   Problem Relation Age of Onset    Arthritis Father     Cancer Sister      I have reviewed and agree with the history as documented      E-Cigarette/Vaping    E-Cigarette Use Never User      E-Cigarette/Vaping Substances    Nicotine No     THC No     CBD No     Flavoring No     Other No     Unknown No      Social History     Tobacco Use    Smoking status: Former Smoker     Packs/day: 2 00     Years: 10 00     Pack years: 20 00     Types: Cigarettes     Quit date: 36     Years since quittin 5    Smokeless tobacco: Never Used   Vaping Use    Vaping Use: Never used   Substance Use Topics    Alcohol use: Never     Alcohol/week: 0 0 standard drinks    Drug use: Not Currently       Review of Systems   Constitutional: Negative  HENT: Negative  Eyes: Negative  Respiratory: Negative  Cardiovascular: Negative  Gastrointestinal: Negative  Endocrine: Negative  Genitourinary: Negative  Musculoskeletal: Negative  Allergic/Immunologic: Negative  Neurological: Positive for headaches  Negative for dizziness, seizures and numbness  Hematological: Negative  Psychiatric/Behavioral: Negative  Physical Exam  Physical Exam  Vitals and nursing note reviewed  Constitutional:       Appearance: Normal appearance  She is normal weight  HENT:      Head: Normocephalic and atraumatic  Comments: Patient maintaining airway maintaining secretions  No stridor  No brawniness under the tongue  Uvula midline without edema  Phonation normal, trachea midline, no trismus, no tenderness along the sternocleidomastoid muscle group, patient is appropriately masked  No tenderness along the platysmas muscle group, no injuries noted in the zone 1, 2, 3 of the neck  No temporal tenderness       Right Ear: Tympanic membrane, ear canal and external ear normal       Left Ear: Tympanic membrane, ear canal and external ear normal       Nose: Nose normal       Mouth/Throat:      Mouth: Mucous membranes are moist       Pharynx: Oropharynx is clear  Eyes:      Extraocular Movements: Extraocular movements intact  Conjunctiva/sclera: Conjunctivae normal       Pupils: Pupils are equal, round, and reactive to light  Cardiovascular:      Rate and Rhythm: Normal rate and regular rhythm  Pulses: Normal pulses  Heart sounds: Normal heart sounds  Pulmonary:      Effort: Pulmonary effort is normal       Breath sounds: Normal breath sounds  Abdominal:      General: Abdomen is flat   Bowel sounds are normal    Musculoskeletal: General: No swelling, tenderness or deformity  Normal range of motion  Cervical back: Normal range of motion  Skin:     General: Skin is warm  Capillary Refill: Capillary refill takes less than 2 seconds  Coloration: Skin is not jaundiced  Neurological:      General: No focal deficit present  Mental Status: She is alert and oriented to person, place, and time  Psychiatric:         Mood and Affect: Mood normal          Behavior: Behavior normal          Thought Content: Thought content normal          Judgment: Judgment normal          Vital Signs  ED Triage Vitals [07/05/22 1519]   Temperature Pulse Respirations Blood Pressure SpO2   98 7 °F (37 1 °C) 82 15 124/60 98 %      Temp Source Heart Rate Source Patient Position - Orthostatic VS BP Location FiO2 (%)   Tympanic -- -- Right arm --      Pain Score       7           Vitals:    07/05/22 1519   BP: 124/60   Pulse: 82         Visual Acuity      ED Medications  Medications - No data to display    Diagnostic Studies  Results Reviewed     Procedure Component Value Units Date/Time    FLU/COVID - if FLU clinically relevant [[id number]] Collected: 07/05/22 1556    Lab Status: In process Specimen: Nares from Nasopharyngeal Swab Updated: 07/05/22 1559                 CT head without contrast   Final Result by Delroy Arteaga DO (07/05 1633)      No acute intracranial abnormality  Pansinusitis            Workstation performed: [id number]                    Procedures  Procedures         ED Course                               SBIRT 20yo+    Flowsheet Row Most Recent Value   SBIRT (25 yo +)    In order to provide better care to our patients, we are screening all of our patients for alcohol and drug use  Would it be okay to ask you these screening questions? Yes Filed at: 07/05/2022 1552   Initial Alcohol Screen: US AUDIT-C     1  How often do you have a drink containing alcohol? 0 Filed at: 07/05/2022 1552   2   How many drinks containing alcohol do you have on a typical day you are drinking? 0 Filed at: 07/05/2022 1552   3a  Male UNDER 65: How often do you have five or more drinks on one occasion? 0 Filed at: 07/05/2022 1552   3b  FEMALE Any Age, or MALE 65+: How often do you have 4 or more drinks on one occassion? 0 Filed at: 07/05/2022 1552   Audit-C Score 0 Filed at: 07/05/2022 1552   CATHERINE: How many times in the past year have you    Used an illegal drug or used a prescription medication for non-medical reasons? Never Filed at: 07/05/2022 1552                    MDM  Number of Diagnoses or Management Options  Headache  Sinusitis, acute  Diagnosis management comments: 80-year-old female presents the emergency department with posterior headache, neck no jaw claudication, no temporal pain upon palpation, no visual changes, incidental fall last month, CT the head negative for any acute injuries, no blood thinners, fall was mechanical, no prodrome prior to falling, found to have pansinusitis on CT imaging, advised patient start amoxicillin, she reports that she has GI upset sometimes with antibiotics, advised patient to using probiotics at home with the consumption of yogurt  Non emergent viral testing ordered  Portions of the record may have been created with voice recognition software  Occasional wrong word or "sound a like" substitutions may have occurred due to the inherent limitations of voice recognition software  Read the chart carefully and recognize, using context, where substitutions have occurred  Counseling: I had a detailed discussion with the patient and/or guardian regarding: the historical points, exam findings, and any diagnostic results supporting the discharge diagnosis, lab results, radiology results, discharge instructions reviewed with patient and/or family/caregiver and understanding was verbalized   Instructions given to return to the emergency department if symptoms worsen or persist, or if there are any questions or concerns that arise at home         Amount and/or Complexity of Data Reviewed  Tests in the radiology section of CPT®: reviewed and ordered  Tests in the medicine section of CPT®: reviewed and ordered        Disposition  Final diagnoses:   Headache   Sinusitis, acute     Time reflects when diagnosis was documented in both MDM as applicable and the Disposition within this note     Time User Action Codes Description Comment    7/5/2022  4:35 PM Migue Franklin Add [R51 9] Headache     7/5/2022  4:36 PM Migue Franklin Add [J01 90] Sinusitis, acute       ED Disposition     ED Disposition   Discharge    Condition   Stable    Date/Time   Tue Jul 5, 2022  4:35 PM    Comment   Danielle Victoria discharge to home/self care  Follow-up Information     Follow up With Specialties Details Why Contact Info    Connie Vasques MD Internal Medicine   [address] [place] [address] [address]      Connie Vasques MD Internal Medicine   [address] [place] [id number]  [phone]            Patient's Medications   Discharge Prescriptions    AMOXICILLIN (AMOXIL) 500 MG CAPSULE    Take 1 capsule (500 mg total) by mouth 3 (three) times a day for 10 days       Start Date: 7/5/2022  End Date: 7/15/2022       Order Dose: 500 mg       Quantity: 30 capsule    Refills: 0       No discharge procedures on file      PDMP Review     None          ED Provider  Electronically Signed by           Brenda Glynn III, DO  07/05/22 4509

## 2022-07-06 LAB
FLUAV RNA RESP QL NAA+PROBE: NEGATIVE
FLUBV RNA RESP QL NAA+PROBE: NEGATIVE
SARS-COV-2 RNA RESP QL NAA+PROBE: NEGATIVE

## 2022-10-11 ENCOUNTER — HOSPITAL ENCOUNTER (OUTPATIENT)
Dept: CT IMAGING | Facility: HOSPITAL | Age: 87
Discharge: HOME | End: 2022-10-11
Attending: INTERNAL MEDICINE
Payer: MEDICARE

## 2022-10-11 DIAGNOSIS — R51.9 NONINTRACTABLE HEADACHE, UNSPECIFIED CHRONICITY PATTERN, UNSPECIFIED HEADACHE TYPE: ICD-10-CM

## 2022-10-11 PROCEDURE — G1004 CDSM NDSC: HCPCS

## 2022-10-11 PROCEDURE — 70450 CT HEAD/BRAIN W/O DYE: CPT

## 2023-01-13 ENCOUNTER — APPOINTMENT (OUTPATIENT)
Dept: LAB | Facility: HOSPITAL | Age: 88
End: 2023-01-13

## 2023-01-13 DIAGNOSIS — R53.83 FATIGUE, UNSPECIFIED TYPE: ICD-10-CM

## 2023-01-13 LAB
ALBUMIN SERPL BCP-MCNC: 4.4 G/DL (ref 3.5–5)
ALP SERPL-CCNC: 86 U/L (ref 34–104)
ALT SERPL W P-5'-P-CCNC: 18 U/L (ref 7–52)
ANION GAP SERPL CALCULATED.3IONS-SCNC: 9 MMOL/L (ref 4–13)
AST SERPL W P-5'-P-CCNC: 31 U/L (ref 13–39)
BASOPHILS # BLD AUTO: 0.02 THOUSANDS/ÂΜL (ref 0–0.1)
BASOPHILS NFR BLD AUTO: 1 % (ref 0–1)
BILIRUB SERPL-MCNC: 0.67 MG/DL (ref 0.2–1)
BUN SERPL-MCNC: 25 MG/DL (ref 5–25)
CALCIUM SERPL-MCNC: 10.2 MG/DL (ref 8.4–10.2)
CHLORIDE SERPL-SCNC: 100 MMOL/L (ref 96–108)
CO2 SERPL-SCNC: 29 MMOL/L (ref 21–32)
CREAT SERPL-MCNC: 0.79 MG/DL (ref 0.6–1.3)
EOSINOPHIL # BLD AUTO: 0.07 THOUSAND/ÂΜL (ref 0–0.61)
EOSINOPHIL NFR BLD AUTO: 3 % (ref 0–6)
ERYTHROCYTE [DISTWIDTH] IN BLOOD BY AUTOMATED COUNT: 16.2 % (ref 11.6–15.1)
GFR SERPL CREATININE-BSD FRML MDRD: 65 ML/MIN/1.73SQ M
GLUCOSE P FAST SERPL-MCNC: 89 MG/DL (ref 65–99)
HCT VFR BLD AUTO: 40.4 % (ref 34.8–46.1)
HGB BLD-MCNC: 13.4 G/DL (ref 11.5–15.4)
IMM GRANULOCYTES # BLD AUTO: 0.01 THOUSAND/UL (ref 0–0.2)
IMM GRANULOCYTES NFR BLD AUTO: 0 % (ref 0–2)
LYMPHOCYTES # BLD AUTO: 0.53 THOUSANDS/ÂΜL (ref 0.6–4.47)
LYMPHOCYTES NFR BLD AUTO: 19 % (ref 14–44)
MCH RBC QN AUTO: 32.1 PG (ref 26.8–34.3)
MCHC RBC AUTO-ENTMCNC: 33.2 G/DL (ref 31.4–37.4)
MCV RBC AUTO: 97 FL (ref 82–98)
MONOCYTES # BLD AUTO: 0.29 THOUSAND/ÂΜL (ref 0.17–1.22)
MONOCYTES NFR BLD AUTO: 10 % (ref 4–12)
NEUTROPHILS # BLD AUTO: 1.87 THOUSANDS/ÂΜL (ref 1.85–7.62)
NEUTS SEG NFR BLD AUTO: 67 % (ref 43–75)
NRBC BLD AUTO-RTO: 0 /100 WBCS
PLATELET # BLD AUTO: 149 THOUSANDS/UL (ref 149–390)
PMV BLD AUTO: 9.9 FL (ref 8.9–12.7)
POTASSIUM SERPL-SCNC: 4.2 MMOL/L (ref 3.5–5.3)
PROT SERPL-MCNC: 7.1 G/DL (ref 6.4–8.4)
RBC # BLD AUTO: 4.18 MILLION/UL (ref 3.81–5.12)
SODIUM SERPL-SCNC: 138 MMOL/L (ref 135–147)
TSH SERPL DL<=0.05 MIU/L-ACNC: 2.07 UIU/ML (ref 0.45–4.5)
WBC # BLD AUTO: 2.79 THOUSAND/UL (ref 4.31–10.16)

## 2023-01-31 ENCOUNTER — HOSPITAL ENCOUNTER (OUTPATIENT)
Dept: RADIOLOGY | Facility: HOSPITAL | Age: 88
Discharge: HOME | End: 2023-01-31
Attending: INTERNAL MEDICINE

## 2023-01-31 DIAGNOSIS — R51.9 FACIAL PAIN: ICD-10-CM

## 2023-05-20 NOTE — QUICK NOTE
Wound culture result reviewed  Discussed case with ID on-call- recommended linezolid 300 mg q12hr for 4 more days for renally adjusted dose  Spoke with [facility]- does not supply 300 mg but 600 mg is available  Doxycycline script cancelled and new script for 600 mg q12 of zyvox  Of note, patient did not  the doxycycline script yet  [FreeTextEntry1] : Symptomatic treatment of fever and/or pain discussed\par Stat strep test ordered\par Throat culture, if POSITIVE, pretreated\par Hydrate well\par Handwashing and infection control discussed\par Return to office if febrile > 48 hours or if symptoms get worse\par Go to ER if unable to come to the office or during after hours, parent encouraged to call service first before doing so.\par Complete 10 days of antibiotic. Provide ibuprofen as needed for pain or fever. If no improvement within 48 hours return for re-evaluation. Follow up in 2-3 wks for tympanometry.\par

## 2023-08-07 ENCOUNTER — HOSPITAL ENCOUNTER (EMERGENCY)
Facility: HOSPITAL | Age: 88
Discharge: HOME | End: 2023-08-07
Attending: EMERGENCY MEDICINE
Payer: MEDICARE

## 2023-08-07 VITALS
DIASTOLIC BLOOD PRESSURE: 61 MMHG | RESPIRATION RATE: 18 BRPM | HEART RATE: 87 BPM | OXYGEN SATURATION: 93 % | WEIGHT: 112 LBS | HEIGHT: 64 IN | SYSTOLIC BLOOD PRESSURE: 106 MMHG | TEMPERATURE: 97.6 F | BODY MASS INDEX: 19.12 KG/M2

## 2023-08-07 DIAGNOSIS — J06.9 VIRAL URI WITH COUGH: Primary | ICD-10-CM

## 2023-08-07 LAB
FLUAV RNA RESP QL NAA+PROBE: NEGATIVE
FLUBV RNA RESP QL NAA+PROBE: NEGATIVE
RSV RNA RESP QL NAA+PROBE: NEGATIVE
SARS-COV-2 RNA RESP QL NAA+PROBE: POSITIVE

## 2023-08-07 PROCEDURE — 99283 EMERGENCY DEPT VISIT LOW MDM: CPT

## 2023-08-07 PROCEDURE — 99284 EMERGENCY DEPT VISIT MOD MDM: CPT

## 2023-08-07 PROCEDURE — 0241U HB NFCT DS VIR RESP RNA 4 TRGT: CPT

## 2023-08-07 RX ORDER — BENZONATATE 100 MG/1
100 CAPSULE ORAL EVERY 8 HOURS
Qty: 21 CAPSULE | Refills: 0 | Status: SHIPPED | OUTPATIENT
Start: 2023-08-07

## 2023-08-07 RX ORDER — DIPHENHYDRAMINE HYDROCHLORIDE AND LIDOCAINE HYDROCHLORIDE AND ALUMINUM HYDROXIDE AND MAGNESIUM HYDRO
10 KIT ONCE
Status: COMPLETED | OUTPATIENT
Start: 2023-08-07 | End: 2023-08-07

## 2023-08-07 RX ORDER — BENZONATATE 100 MG/1
100 CAPSULE ORAL ONCE
Status: COMPLETED | OUTPATIENT
Start: 2023-08-07 | End: 2023-08-07

## 2023-08-07 RX ADMIN — BENZONATATE 100 MG: 100 CAPSULE ORAL at 14:19

## 2023-08-07 RX ADMIN — DIPHENHYDRAMINE HYDROCHLORIDE AND LIDOCAINE HYDROCHLORIDE AND ALUMINUM HYDROXIDE AND MAGNESIUM HYDRO 10 ML: KIT at 14:19

## 2023-08-07 NOTE — ED PROVIDER NOTES
History  Chief Complaint   Patient presents with   • OLIVIA mckinley be tested for the covid   • Sore Throat     68-year-old female without pertinent medical history who presents to the emergency department for evaluation of sore throat and cough that have been going on for the past 4 days. Patient does not report any fevers or chills at home. Denies shortness of breath, chest pain, abdominal pain, nausea, vomiting. Has not tried anything for symptoms at home. She states that she regularly encounters sick contacts with going out of the house to grocery stores and shopping. Reports a nonproductive cough and sore throat as well as ear fullness and congestion. Requesting COVID test today. History provided by:  Patient   used: No        Prior to Admission Medications   Prescriptions Last Dose Informant Patient Reported? Taking? Multiple Vitamin (multivitamin) capsule   Yes No   Sig: Take 1 capsule by mouth daily   Omega-3 Fatty Acids (fish oil) 1,000 mg   Yes No   Sig: Take 1,000 mg by mouth daily   Turmeric 500 MG CAPS   Yes No   Sig: Take 2 capsules by mouth daily      Facility-Administered Medications: None       History reviewed. No pertinent past medical history. Past Surgical History:   Procedure Laterality Date   • BUNIONECTOMY     • JOINT REPLACEMENT Right     Hip       Family History   Problem Relation Age of Onset   • Arthritis Father    • Cancer Sister      I have reviewed and agree with the history as documented.     E-Cigarette/Vaping   • E-Cigarette Use Never User      E-Cigarette/Vaping Substances   • Nicotine No    • THC No    • CBD No    • Flavoring No    • Other No    • Unknown No      Social History     Tobacco Use   • Smoking status: Former     Packs/day: 2.00     Years: 10.00     Total pack years: 20.00     Types: Cigarettes     Quit date: 36     Years since quittin.6   • Smokeless tobacco: Never   Vaping Use   • Vaping Use: Never used   Substance Use Topics • Alcohol use: Never     Alcohol/week: 0.0 standard drinks of alcohol   • Drug use: Not Currently       Review of Systems   Constitutional: Negative for chills and fever. HENT: Positive for congestion, rhinorrhea and sore throat. Negative for ear pain. Eyes: Negative for pain and visual disturbance. Respiratory: Positive for cough. Negative for shortness of breath. Cardiovascular: Negative for chest pain and palpitations. Gastrointestinal: Negative for abdominal pain and vomiting. Genitourinary: Negative for dysuria and hematuria. Musculoskeletal: Negative for arthralgias and back pain. Skin: Negative for color change and rash. Neurological: Negative for seizures and syncope. All other systems reviewed and are negative. Physical Exam  Physical Exam  Vitals and nursing note reviewed. Constitutional:       General: She is not in acute distress. Appearance: Normal appearance. She is well-developed and normal weight. She is not ill-appearing. HENT:      Head: Normocephalic and atraumatic. Right Ear: Tympanic membrane, ear canal and external ear normal. No drainage. Left Ear: Tympanic membrane, ear canal and external ear normal. No drainage. Nose: Nose normal. No congestion or rhinorrhea. Mouth/Throat:      Mouth: Mucous membranes are moist.      Pharynx: Oropharynx is clear. Posterior oropharyngeal erythema present. No oropharyngeal exudate. Tonsils: No tonsillar exudate. Eyes:      General: No scleral icterus. Right eye: No discharge. Left eye: No discharge. Extraocular Movements: Extraocular movements intact. Conjunctiva/sclera: Conjunctivae normal.      Pupils: Pupils are equal, round, and reactive to light. Cardiovascular:      Rate and Rhythm: Normal rate and regular rhythm. Pulses: Normal pulses. Heart sounds: Normal heart sounds. No murmur heard.   Pulmonary:      Effort: Pulmonary effort is normal. No respiratory distress. Breath sounds: Normal breath sounds. No wheezing or rales. Chest:      Chest wall: No tenderness. Abdominal:      General: Abdomen is flat. Bowel sounds are normal.      Palpations: Abdomen is soft. Tenderness: There is no abdominal tenderness. There is no right CVA tenderness or left CVA tenderness. Musculoskeletal:         General: No signs of injury. Normal range of motion. Cervical back: Normal range of motion and neck supple. No tenderness. Right lower leg: No edema. Left lower leg: No edema. Skin:     General: Skin is warm and dry. Capillary Refill: Capillary refill takes less than 2 seconds. Findings: No rash. Neurological:      General: No focal deficit present. Mental Status: She is alert and oriented to person, place, and time. Mental status is at baseline. Motor: No weakness. Gait: Gait normal.   Psychiatric:         Mood and Affect: Mood normal.         Behavior: Behavior normal.         Thought Content: Thought content normal.         Vital Signs  ED Triage Vitals [08/07/23 1333]   Temperature Pulse Respirations Blood Pressure SpO2   97.6 °F (36.4 °C) 87 18 106/61 93 %      Temp Source Heart Rate Source Patient Position - Orthostatic VS BP Location FiO2 (%)   Tympanic Monitor Sitting Right arm --      Pain Score       No Pain           Vitals:    08/07/23 1333   BP: 106/61   Pulse: 87   Patient Position - Orthostatic VS: Sitting         Visual Acuity      ED Medications  Medications   diphenhydramine, lidocaine, Al/Mg hydroxide, simethicone (Magic Mouthwash) oral solution 10 mL (10 mL Swish & Swallow Given 8/7/23 1419)   benzonatate (TESSALON PERLES) capsule 100 mg (100 mg Oral Given 8/7/23 1419)       Diagnostic Studies  Results Reviewed     Procedure Component Value Units Date/Time    FLU/RSV/COVID - if FLU/RSV clinically relevant [[id number]] Collected: 08/07/23 1430    Lab Status:  In process Specimen: Nares from Nose Updated: 08/07/23 1434                 No orders to display              Procedures  Procedures         ED Course  ED Course as of 08/07/23 1511   Mon Aug 07, 2023   1416 Patient overall well appearing, stable vitals. Satting appropriately on room air. Just wants COVID test. Lives at home with daughter. I offered bloodwork and chest XR however patient states that she feels comfortable going home and would like to just get COVID tested. Medical Decision Making  80-year-old female without pertinent medical history presents to the emergency department for COVID testing. Has been symptomatic for the past 4 days with known sick contacts. Patient is overall well-appearing and in no distress. Vital signs are stable and patient is afebrile. Differential: High clinical suspicion for viral upper respiratory infection. Considered strep pharyngitis however patient has a cough with negative Centor criteria. Considered pneumonia however lungs are clear to auscultation bilaterally and patient is afebrile. Considered sepsis however patient is well-appearing and vital signs are stable. I did offer patient basic blood work and chest x-ray however patient politely declines and states that she would just like to be COVID tested and discharged. Will proceed with COVID, flu, RSV testing and have patient follow-up with her family doctor. Considered hospitalization however patient is stable for outpatient management. I discussed symptomatic management with Tessalon Perles, over-the-counter cough medicine as well as Tylenol and Motrin. Magic mouthwash and Tessalon Perles given in the emergency department to help with symptoms. Hold off from steroids and antibiotics for management as patient is well-appearing. Patient in agreement with the plan. Strict return precautions discussed. Patient stable at time of discharge. Able to ambulate at baseline without assistance.     Viral URI with cough: acute illness or injury  Amount and/or Complexity of Data Reviewed  Labs: ordered. Risk  Prescription drug management. Disposition  Final diagnoses:   Viral URI with cough     Time reflects when diagnosis was documented in both MDM as applicable and the Disposition within this note     Time User Action Codes Description Comment    8/7/2023  2:46 PM Lauren Philippe Add [J06.9] Viral URI with cough       ED Disposition     ED Disposition   Discharge    Condition   Stable    Date/Time   Mon Aug 7, 2023  2:46 PM    Comment   Janki Cotton discharge to home/self care. Follow-up Information     Follow up With Specialties Details [facility] Emergency Department Emergency Medicine Go to  If symptoms worsen [address] [facility] [place] [id number] [phone] [address] [facility] Emergency Department, [address] [facility] WITH [facility] [address] [facility]    Thalia Edmond MD Internal Medicine Schedule an appointment as soon as possible for a visit  follow up for further evaluation of symptoms [address] [facility] [facility] [address]  [phone]             Discharge Medication List as of 8/7/2023  2:46 PM      START taking these medications    Details   benzonatate (TESSALON PERLES) 100 mg capsule Take 1 capsule (100 mg total) by mouth every 8 (eight) hours, Starting Mon 8/7/2023, Normal         CONTINUE these medications which have NOT CHANGED    Details   Multiple Vitamin (multivitamin) capsule Take 1 capsule by mouth daily, Historical Med      Omega-3 Fatty Acids (fish oil) 1,000 mg Take 1,000 mg by mouth daily, Historical Med      Turmeric 500 MG CAPS Take 2 capsules by mouth daily, Historical Med             No discharge procedures on file.     PDMP Review     None          ED Provider  Electronically Signed by           Lis Malin PA-C  08/07/23 5226

## 2023-10-25 ENCOUNTER — APPOINTMENT (OUTPATIENT)
Dept: LAB | Facility: HOSPITAL | Age: 88
End: 2023-10-25
Payer: MEDICARE

## 2023-10-25 DIAGNOSIS — R63.4 WEIGHT LOSS: ICD-10-CM

## 2023-10-25 LAB
ALBUMIN SERPL BCP-MCNC: 4.1 G/DL (ref 3.5–5)
ALP SERPL-CCNC: 76 U/L (ref 34–104)
ALT SERPL W P-5'-P-CCNC: 18 U/L (ref 7–52)
ANION GAP SERPL CALCULATED.3IONS-SCNC: 7 MMOL/L
AST SERPL W P-5'-P-CCNC: 35 U/L (ref 13–39)
BILIRUB SERPL-MCNC: 0.83 MG/DL (ref 0.2–1)
BUN SERPL-MCNC: 26 MG/DL (ref 5–25)
CALCIUM SERPL-MCNC: 10.3 MG/DL (ref 8.4–10.2)
CHLORIDE SERPL-SCNC: 102 MMOL/L (ref 96–108)
CO2 SERPL-SCNC: 30 MMOL/L (ref 21–32)
CREAT SERPL-MCNC: 0.96 MG/DL (ref 0.6–1.3)
ERYTHROCYTE [DISTWIDTH] IN BLOOD BY AUTOMATED COUNT: 14.1 % (ref 11.6–15.1)
ERYTHROCYTE [SEDIMENTATION RATE] IN BLOOD: 23 MM/HOUR (ref 0–29)
GFR SERPL CREATININE-BSD FRML MDRD: 51 ML/MIN/1.73SQ M
GLUCOSE SERPL-MCNC: 87 MG/DL (ref 65–140)
HCT VFR BLD AUTO: 39.1 % (ref 34.8–46.1)
HGB BLD-MCNC: 12.9 G/DL (ref 11.5–15.4)
MCH RBC QN AUTO: 33.8 PG (ref 26.8–34.3)
MCHC RBC AUTO-ENTMCNC: 33 G/DL (ref 31.4–37.4)
MCV RBC AUTO: 102 FL (ref 82–98)
PLATELET # BLD AUTO: 180 THOUSANDS/UL (ref 149–390)
PMV BLD AUTO: 10.5 FL (ref 8.9–12.7)
POTASSIUM SERPL-SCNC: 4 MMOL/L (ref 3.5–5.3)
PROT SERPL-MCNC: 7.1 G/DL (ref 6.4–8.4)
RBC # BLD AUTO: 3.82 MILLION/UL (ref 3.81–5.12)
SODIUM SERPL-SCNC: 139 MMOL/L (ref 135–147)
WBC # BLD AUTO: 3.79 THOUSAND/UL (ref 4.31–10.16)

## 2023-10-25 PROCEDURE — 36415 COLL VENOUS BLD VENIPUNCTURE: CPT

## 2023-10-25 PROCEDURE — 80053 COMPREHEN METABOLIC PANEL: CPT

## 2023-10-25 PROCEDURE — 85027 COMPLETE CBC AUTOMATED: CPT

## 2023-10-25 PROCEDURE — 85652 RBC SED RATE AUTOMATED: CPT

## 2023-11-06 ENCOUNTER — EVALUATION (OUTPATIENT)
Dept: PHYSICAL THERAPY | Facility: CLINIC | Age: 88
End: 2023-11-06
Payer: MEDICARE

## 2023-11-06 DIAGNOSIS — Z74.09 IMPAIRED FUNCTIONAL MOBILITY, BALANCE, GAIT, AND ENDURANCE: Primary | ICD-10-CM

## 2023-11-06 DIAGNOSIS — R42 DIZZINESS: ICD-10-CM

## 2023-11-06 PROCEDURE — 97112 NEUROMUSCULAR REEDUCATION: CPT | Performed by: PHYSICAL THERAPIST

## 2023-11-06 PROCEDURE — 97162 PT EVAL MOD COMPLEX 30 MIN: CPT | Performed by: PHYSICAL THERAPIST

## 2023-11-06 NOTE — LETTER
2023    Austyn Parker, [address] [facility] [address] [place] [address]    Patient: David Pride   YOB: 1931   Date of Visit: 2023     Encounter Diagnosis     ICD-10-CM    1. Impaired functional mobility, balance, gait, and endurance  Z74.09       2. Reinier Lee           Dear Dr. Reuben Osuna: Thank you for your recent referral of David Pride. Please review the attached evaluation summary from Sohail Ibarra recent visit. Please verify that you agree with the plan of care by signing the attached order. If you have any questions or concerns, please do not hesitate to call. I sincerely appreciate the opportunity to share in the care of one of your patients and hope to have another opportunity to work with you in the near future. Sincerely,    Christina Corbett, PT      Referring Provider:      I certify that I have read the below Plan of Care and certify the need for these services furnished under this plan of treatment while under my care. Austyn Parker MD  [address] [facility] [address] [place] [address]  Via Fax: [phone]          PT Evaluation     Today's date: 2023  Patient name: David Pride  : 1931  MRN: [id number]  Referring provider: Austyn Parker MD  Dx:   Encounter Diagnosis     ICD-10-CM    1. Impaired functional mobility, balance, gait, and endurance  Z74.09       2. Dizziness  R42           Start Time: 1525  Stop Time: 1600  Total time in clinic (min): 35 minutes    Assessment  Assessment details: Pt is a 80 YOF referred to OPPT for gait and dizziness. Pt had subjective report of improved dizziness and was asymptomatic with all oculomotor and vestibular testing today. She did demo saccadic smooth pursuits and positive R head thrust so she may have a unilateral vestibular function. Pt also demo decreased LE strength, impaired balance edward EC and on foam, impaired coordination, and is considered a high fall risk.  Due to above impairments, pt demo difficulty with ambulation and balance. Pt would benefit from skilled PT to maximize functional mobility, decrease fall risk, improve strength, balance, and endurance, and improve QOL. Goals  ST weeks  1. TUG improve by 3 sec or > to demo improved mobility and balance  2. Completed 5 x STS with min A or < to demo improved LE strength and balance   3. Complete 15 sec FTEO on foam during MCTSIB to demo improved balance    LT weeks  1. TUG < 12 sec to demo decreased fall risk   2. Independent with updated HEP to self manage and maintain progress outside of PT  3. Report no dizziness       Plan  Planned therapy interventions: stretching, strengthening, therapeutic activities, therapeutic exercise, patient education, neuromuscular re-education, balance, gait training and home exercise program  Frequency: 2x week  Duration in weeks: 8  Plan of Care beginning date: 2023  Plan of Care expiration date: 2024  Treatment plan discussed with: PTA and patient        Subjective Evaluation    History of Present Illness  Mechanism of injury: Has dizziness mostly in the morning. Dizziness has been going on for several months. Had COVID at end of August and had dizziness since then. Has not noticed it much in the morning anymore, thinks it is getting better. Dizziness described as lightheaded. Used to last hour, but now dizziness does not last very long anymore. Did not have dizziness this morning. Dizziness does not affect her balance. Dizziness is only when she is standing up in the morning. Overall feeling much better about her dizziness. Uses quad cane for community ambulation. Uses RW at night for the bathroom. Does not use AD in the house during the day. Overall she is not as active as she used to be. Has hx of R KANG about 10 years ago. Does take medications for dizziness as needed but does not remember the name.    Patient Goals  Patient goals for therapy: improved balance    Pain  No pain reported    Social Support  Steps to enter house: yes (4 JAMES with HR)  Lives in: [facility] house  Lives with: adult children (daughter)          Objective     Concurrent Complaints  Positive for peripheral neuropathy. Negative for headaches, nausea/motion sickness, tinnitus, visual change, hearing loss, memory loss, aural fullness and poor concentration  Neuro Exam:     Dizziness  Positive for light-headedness and floating or swimming. Negative for disequilibrium, vertigo, oscillopsia, motion sickness, rocking or swaying and diplopia. Exacerbating factors  Positive for looking up, walking and turning head. Negative for bending over, rolling in bed, supine to/from sitting, optokinetic movement and walking in busy environment.      Headaches   Patient reports headaches: No.     Cervical exam   Modified VBI   Left: asymptomatic  Right: asymptomatic    Oculomotor exam   Oculomotor ROM: WNL  Resting nystagmus: not present   Gaze holding nystagmus: not present left  and not present right  Smooth pursuits: saccadic smooth pursuit  Vertical saccades: normal  Horizontal saccades: normal  Convergence: normal  Head thrust: left normal  Head thrust: right abnormal    Positional testing   [place]-Hallpike   Left posterior canal: WNL  Right posterior canal: WNL  Roll test   Left horizontal canal: WNL  Right horizontal canal: WNL      Balance assessments   MCTSIB   Eyes open level surface: 30  Eyes open foam surface: 3  Eyes closed level surface: 30 - increased swaying  Eyes closed foam surface: NT      BP seated 142/70  BP standing 136/68    Balance Test 11/6   6 Minute Walk Test (ft):    2 Minute Walk Test (ft):    Gait Speed (ft/s):    5x Sit To Stand (s): Unable    TUG: 15.78           Manual Muscle Testing - Hip Left Right   Flexion 4 4   Extension     Abduction     External Rotation       Manual Muscle Testing - Knee Left Right   Flexion 4 4   Extension 4+ 4+     Manual Muscle Testing - Ankle Left Right   Doriflexion 4 4   Plantarflexion                   Re-eval Date: 12/6    Date 11/6       Visit Count 1       FOTO        Pain In        Pain Out        Vitals             Precautions fall risk       Manuals                                        Neuro Re-Ed         VOR x 1         HKM        Sidestepping         BW amb        Step ups         Amb with HT/HN          STS        Static balance FTEC, FAEO foam         Education  Education on POC, diagnosis, prognosis 10 min        Ther Ex        Nustep                                                                 Ther Activity                        Gait Training                        Modalities

## 2023-11-06 NOTE — PROGRESS NOTES
PT Evaluation     Today's date: 2023  Patient name: Darrel Whittaker  : 1931  MRN: [id number]  Referring provider: Marilyn Maya MD  Dx:   Encounter Diagnosis     ICD-10-CM    1. Impaired functional mobility, balance, gait, and endurance  Z74.09       2. Dizziness  R42           Start Time: 1525  Stop Time: 1600  Total time in clinic (min): 35 minutes    Assessment  Assessment details: Pt is a 80 YOF referred to OPPT for gait and dizziness. Pt had subjective report of improved dizziness and was asymptomatic with all oculomotor and vestibular testing today. She did demo saccadic smooth pursuits and positive R head thrust so she may have a unilateral vestibular function. Pt also demo decreased LE strength, impaired balance edward EC and on foam, impaired coordination, and is considered a high fall risk. Due to above impairments, pt demo difficulty with ambulation and balance. Pt would benefit from skilled PT to maximize functional mobility, decrease fall risk, improve strength, balance, and endurance, and improve QOL. Goals  ST weeks  1. TUG improve by 3 sec or > to demo improved mobility and balance  2. Completed 5 x STS with min A or < to demo improved LE strength and balance   3. Complete 15 sec FTEO on foam during MCTSIB to demo improved balance    LT weeks  1. TUG < 12 sec to demo decreased fall risk   2. Independent with updated HEP to self manage and maintain progress outside of PT  3.  Report no dizziness       Plan  Planned therapy interventions: stretching, strengthening, therapeutic activities, therapeutic exercise, patient education, neuromuscular re-education, balance, gait training and home exercise program  Frequency: 2x week  Duration in weeks: 8  Plan of Care beginning date: 2023  Plan of Care expiration date: 2024  Treatment plan discussed with: PTA and patient        Subjective Evaluation    History of Present Illness  Mechanism of injury: Has dizziness mostly in the morning. Dizziness has been going on for several months. Had COVID at end of August and had dizziness since then. Has not noticed it much in the morning anymore, thinks it is getting better. Dizziness described as lightheaded. Used to last hour, but now dizziness does not last very long anymore. Did not have dizziness this morning. Dizziness does not affect her balance. Dizziness is only when she is standing up in the morning. Overall feeling much better about her dizziness. Uses quad cane for community ambulation. Uses RW at night for the bathroom. Does not use AD in the house during the day. Overall she is not as active as she used to be. Has hx of R KANG about 10 years ago. Does take medications for dizziness as needed but does not remember the name. Patient Goals  Patient goals for therapy: improved balance    Pain  No pain reported    Social Support  Steps to enter house: yes (4 JAMES with HR)  L[facility] in: [facility]  Lives with: adult children (daughter)          Objective     Concurrent Complaints  Positive for peripheral neuropathy. Negative for headaches, nausea/motion sickness, tinnitus, visual change, hearing loss, memory loss, aural fullness and poor concentration  Neuro Exam:     Dizziness  Positive for light-headedness and floating or swimming. Negative for disequilibrium, vertigo, oscillopsia, motion sickness, rocking or swaying and diplopia. Exacerbating factors  Positive for looking up, walking and turning head. Negative for bending over, rolling in bed, supine to/from sitting, optokinetic movement and walking in busy environment.      Headaches   Patient reports headaches: No.     Cervical exam   Modified VBI   Left: asymptomatic  Right: asymptomatic    Oculomotor exam   Oculomotor ROM: WNL  Resting nystagmus: not present   Gaze holding nystagmus: not present left  and not present right  Smooth pursuits: saccadic smooth pursuit  Vertical saccades: normal  Horizontal saccades: normal  Convergence: normal  Head thrust: left normal  Head thrust: right abnormal    Positional testing   [place]-Hallpike   Left posterior canal: WNL  Right posterior canal: WNL  Roll test   Left horizontal canal: WNL  Right horizontal canal: WNL      Balance assessments   MCTSIB   Eyes open level surface: 30  Eyes open foam surface: 3  Eyes closed level surface: 30 - increased swaying  Eyes closed foam surface: NT      BP seated 142/70  BP standing 136/68    Balance Test 11/6   6 Minute Walk Test (ft):    2 Minute Walk Test (ft):    Gait Speed (ft/s):    5x Sit To Stand (s): Unable    TUG: 15.78           Manual Muscle Testing - Hip Left Right   Flexion 4 4   Extension     Abduction     External Rotation       Manual Muscle Testing - Knee Left Right   Flexion 4 4   Extension 4+ 4+     Manual Muscle Testing - Ankle Left Right   Doriflexion 4 4   Plantarflexion                   Re-eval Date: 12/6    Date 11/6       Visit Count 1       FOTO        Pain In        Pain Out        Vitals             Precautions fall risk       Manuals                                        Neuro Re-Ed         VOR x 1         HKM        Sidestepping         BW amb        Step ups         Amb with HT/HN          STS        Static balance FTEC, FAEO foam         Education  Education on POC, diagnosis, prognosis 10 min        Ther Ex        Nustep                                                                 Ther Activity                        Gait Training                        Modalities

## 2023-11-07 ENCOUNTER — APPOINTMENT (OUTPATIENT)
Dept: PHYSICAL THERAPY | Facility: CLINIC | Age: 88
End: 2023-11-07
Payer: MEDICARE

## 2023-11-14 ENCOUNTER — APPOINTMENT (OUTPATIENT)
Dept: PHYSICAL THERAPY | Facility: CLINIC | Age: 88
End: 2023-11-14
Payer: MEDICARE

## 2023-11-16 ENCOUNTER — APPOINTMENT (OUTPATIENT)
Dept: PHYSICAL THERAPY | Facility: CLINIC | Age: 88
End: 2023-11-16
Payer: MEDICARE

## 2023-11-20 ENCOUNTER — APPOINTMENT (OUTPATIENT)
Dept: PHYSICAL THERAPY | Facility: CLINIC | Age: 88
End: 2023-11-20
Payer: MEDICARE

## 2023-11-21 ENCOUNTER — APPOINTMENT (OUTPATIENT)
Dept: PHYSICAL THERAPY | Facility: CLINIC | Age: 88
End: 2023-11-21
Payer: MEDICARE

## 2023-11-28 ENCOUNTER — APPOINTMENT (OUTPATIENT)
Dept: PHYSICAL THERAPY | Facility: CLINIC | Age: 88
End: 2023-11-28
Payer: MEDICARE

## 2023-11-30 ENCOUNTER — APPOINTMENT (OUTPATIENT)
Dept: PHYSICAL THERAPY | Facility: CLINIC | Age: 88
End: 2023-11-30
Payer: MEDICARE

## 2024-01-11 ENCOUNTER — APPOINTMENT (OUTPATIENT)
Dept: LAB | Facility: HOSPITAL | Age: 89
End: 2024-01-11
Payer: MEDICARE

## 2024-01-11 DIAGNOSIS — D72.819 LEUKOPENIA, UNSPECIFIED TYPE: ICD-10-CM

## 2024-01-11 DIAGNOSIS — N28.9 ACUTE RENAL INSUFFICIENCY: ICD-10-CM

## 2024-01-11 LAB
ANION GAP SERPL CALCULATED.3IONS-SCNC: 7 MMOL/L
BASOPHILS # BLD AUTO: 0.03 THOUSANDS/ÂΜL (ref 0–0.1)
BASOPHILS NFR BLD AUTO: 1 % (ref 0–1)
BUN SERPL-MCNC: 32 MG/DL (ref 5–25)
CALCIUM SERPL-MCNC: 10.2 MG/DL (ref 8.4–10.2)
CHLORIDE SERPL-SCNC: 102 MMOL/L (ref 96–108)
CO2 SERPL-SCNC: 29 MMOL/L (ref 21–32)
CREAT SERPL-MCNC: 0.76 MG/DL (ref 0.6–1.3)
EOSINOPHIL # BLD AUTO: 0.04 THOUSAND/ÂΜL (ref 0–0.61)
EOSINOPHIL NFR BLD AUTO: 2 % (ref 0–6)
ERYTHROCYTE [DISTWIDTH] IN BLOOD BY AUTOMATED COUNT: 13.2 % (ref 11.6–15.1)
GFR SERPL CREATININE-BSD FRML MDRD: 68 ML/MIN/1.73SQ M
GLUCOSE SERPL-MCNC: 95 MG/DL (ref 65–140)
HCT VFR BLD AUTO: 40.6 % (ref 34.8–46.1)
HGB BLD-MCNC: 13.5 G/DL (ref 11.5–15.4)
IMM GRANULOCYTES # BLD AUTO: 0 THOUSAND/UL (ref 0–0.2)
IMM GRANULOCYTES NFR BLD AUTO: 0 % (ref 0–2)
LYMPHOCYTES # BLD AUTO: 0.35 THOUSANDS/ÂΜL (ref 0.6–4.47)
LYMPHOCYTES NFR BLD AUTO: 14 % (ref 14–44)
MCH RBC QN AUTO: 33.1 PG (ref 26.8–34.3)
MCHC RBC AUTO-ENTMCNC: 33.3 G/DL (ref 31.4–37.4)
MCV RBC AUTO: 100 FL (ref 82–98)
MONOCYTES # BLD AUTO: 0.18 THOUSAND/ÂΜL (ref 0.17–1.22)
MONOCYTES NFR BLD AUTO: 7 % (ref 4–12)
NEUTROPHILS # BLD AUTO: 1.91 THOUSANDS/ÂΜL (ref 1.85–7.62)
NEUTS SEG NFR BLD AUTO: 76 % (ref 43–75)
NRBC BLD AUTO-RTO: 0 /100 WBCS
PLATELET # BLD AUTO: 157 THOUSANDS/UL (ref 149–390)
PMV BLD AUTO: 10.4 FL (ref 8.9–12.7)
POTASSIUM SERPL-SCNC: 4.1 MMOL/L (ref 3.5–5.3)
RBC # BLD AUTO: 4.08 MILLION/UL (ref 3.81–5.12)
SODIUM SERPL-SCNC: 138 MMOL/L (ref 135–147)
WBC # BLD AUTO: 2.51 THOUSAND/UL (ref 4.31–10.16)

## 2024-01-11 PROCEDURE — 80048 BASIC METABOLIC PNL TOTAL CA: CPT

## 2024-01-11 PROCEDURE — 85025 COMPLETE CBC W/AUTO DIFF WBC: CPT

## 2024-01-11 PROCEDURE — 36415 COLL VENOUS BLD VENIPUNCTURE: CPT

## 2024-06-12 ENCOUNTER — APPOINTMENT (OUTPATIENT)
Dept: LAB | Facility: HOSPITAL | Age: 89
End: 2024-06-12
Payer: MEDICARE

## 2024-06-12 DIAGNOSIS — D72.819 LEUKOPENIA, UNSPECIFIED TYPE: ICD-10-CM

## 2024-06-12 LAB
ERYTHROCYTE [DISTWIDTH] IN BLOOD BY AUTOMATED COUNT: 13 % (ref 11.6–15.1)
HCT VFR BLD AUTO: 41.9 % (ref 34.8–46.1)
HGB BLD-MCNC: 14.1 G/DL (ref 11.5–15.4)
MCH RBC QN AUTO: 33.1 PG (ref 26.8–34.3)
MCHC RBC AUTO-ENTMCNC: 33.7 G/DL (ref 31.4–37.4)
MCV RBC AUTO: 98 FL (ref 82–98)
PLATELET # BLD AUTO: 162 THOUSANDS/UL (ref 149–390)
PMV BLD AUTO: 10.3 FL (ref 8.9–12.7)
RBC # BLD AUTO: 4.26 MILLION/UL (ref 3.81–5.12)
WBC # BLD AUTO: 3.63 THOUSAND/UL (ref 4.31–10.16)

## 2024-06-12 PROCEDURE — 85027 COMPLETE CBC AUTOMATED: CPT

## 2024-06-12 PROCEDURE — 36415 COLL VENOUS BLD VENIPUNCTURE: CPT

## 2025-08-12 ENCOUNTER — APPOINTMENT (OUTPATIENT)
Dept: LAB | Facility: HOSPITAL | Age: OVER 89
End: 2025-08-12
Payer: MEDICARE